# Patient Record
Sex: FEMALE | Race: WHITE | NOT HISPANIC OR LATINO | Employment: FULL TIME | URBAN - METROPOLITAN AREA
[De-identification: names, ages, dates, MRNs, and addresses within clinical notes are randomized per-mention and may not be internally consistent; named-entity substitution may affect disease eponyms.]

---

## 2021-04-15 ENCOUNTER — HOSPITAL ENCOUNTER (OUTPATIENT)
Dept: VACCINE CLINIC | Facility: HOSPITAL | Age: 56
Discharge: HOME | End: 2021-04-15
Attending: INTERNAL MEDICINE

## 2021-05-06 ENCOUNTER — HOSPITAL ENCOUNTER (OUTPATIENT)
Dept: VACCINE CLINIC | Facility: HOSPITAL | Age: 56
Discharge: HOME | End: 2021-05-06
Attending: INTERNAL MEDICINE

## 2021-05-07 ENCOUNTER — OFFICE VISIT CONVERTED (OUTPATIENT)
Dept: FAMILY MEDICINE CLINIC | Facility: CLINIC | Age: 56
End: 2021-05-07
Attending: FAMILY MEDICINE

## 2021-05-11 ENCOUNTER — HOSPITAL ENCOUNTER (OUTPATIENT)
Dept: FAMILY MEDICINE CLINIC | Facility: CLINIC | Age: 56
Discharge: HOME | End: 2021-05-11
Attending: FAMILY MEDICINE

## 2021-05-11 LAB
25(OH)D3 SERPL-MCNC: 18.3 NG/ML (ref 30–100)
ALBUMIN SERPL-MCNC: 4.4 G/DL (ref 3.5–5)
ALBUMIN/GLOB SERPL: 1.4 {RATIO} (ref 1.4–2.6)
ALP SERPL-CCNC: 81 U/L (ref 53–141)
ALT SERPL-CCNC: 16 U/L (ref 10–40)
ANION GAP SERPL CALC-SCNC: 14 MMOL/L (ref 8–19)
AST SERPL-CCNC: 16 U/L (ref 15–50)
BASOPHILS # BLD AUTO: 0.09 10*3/UL (ref 0–0.2)
BASOPHILS NFR BLD AUTO: 1.1 % (ref 0–3)
BILIRUB SERPL-MCNC: 0.38 MG/DL (ref 0.2–1.3)
BUN SERPL-MCNC: 11 MG/DL (ref 5–25)
BUN/CREAT SERPL: 16 {RATIO} (ref 6–20)
CALCIUM SERPL-MCNC: 9.1 MG/DL (ref 8.7–10.4)
CHLORIDE SERPL-SCNC: 101 MMOL/L (ref 99–111)
CHOLEST SERPL-MCNC: 187 MG/DL (ref 107–200)
CHOLEST/HDLC SERPL: 3.5 {RATIO} (ref 3–6)
CONV ABS IMM GRAN: 0.02 10*3/UL (ref 0–0.2)
CONV CO2: 28 MMOL/L (ref 22–32)
CONV IMMATURE GRAN: 0.2 % (ref 0–1.8)
CONV TOTAL PROTEIN: 7.6 G/DL (ref 6.3–8.2)
CREAT UR-MCNC: 0.67 MG/DL (ref 0.5–0.9)
DEPRECATED RDW RBC AUTO: 40.2 FL (ref 36.4–46.3)
EOSINOPHIL # BLD AUTO: 0.25 10*3/UL (ref 0–0.7)
EOSINOPHIL # BLD AUTO: 3 % (ref 0–7)
ERYTHROCYTE [DISTWIDTH] IN BLOOD BY AUTOMATED COUNT: 11.9 % (ref 11.7–14.4)
GFR SERPLBLD BASED ON 1.73 SQ M-ARVRAT: >60 ML/MIN/{1.73_M2}
GLOBULIN UR ELPH-MCNC: 3.2 G/DL (ref 2–3.5)
GLUCOSE SERPL-MCNC: 103 MG/DL (ref 65–99)
HCT VFR BLD AUTO: 38 % (ref 37–47)
HDLC SERPL-MCNC: 53 MG/DL (ref 40–60)
HGB BLD-MCNC: 12.7 G/DL (ref 12–16)
LDLC SERPL CALC-MCNC: 118 MG/DL (ref 70–100)
LYMPHOCYTES # BLD AUTO: 2.42 10*3/UL (ref 1–5)
LYMPHOCYTES NFR BLD AUTO: 28.8 % (ref 20–45)
MCH RBC QN AUTO: 30.7 PG (ref 27–31)
MCHC RBC AUTO-ENTMCNC: 33.4 G/DL (ref 33–37)
MCV RBC AUTO: 91.8 FL (ref 81–99)
MONOCYTES # BLD AUTO: 0.43 10*3/UL (ref 0.2–1.2)
MONOCYTES NFR BLD AUTO: 5.1 % (ref 3–10)
NEUTROPHILS # BLD AUTO: 5.18 10*3/UL (ref 2–8)
NEUTROPHILS NFR BLD AUTO: 61.8 % (ref 30–85)
NRBC CBCN: 0 % (ref 0–0.7)
OSMOLALITY SERPL CALC.SUM OF ELEC: 288 MOSM/KG (ref 273–304)
PLATELET # BLD AUTO: 289 10*3/UL (ref 130–400)
PMV BLD AUTO: 9.6 FL (ref 9.4–12.3)
POTASSIUM SERPL-SCNC: 4.1 MMOL/L (ref 3.5–5.3)
RBC # BLD AUTO: 4.14 10*6/UL (ref 4.2–5.4)
SODIUM SERPL-SCNC: 139 MMOL/L (ref 135–147)
TRIGL SERPL-MCNC: 79 MG/DL (ref 40–150)
TSH SERPL-ACNC: 0.67 M[IU]/L (ref 0.27–4.2)
VLDLC SERPL-MCNC: 16 MG/DL (ref 5–37)
WBC # BLD AUTO: 8.39 10*3/UL (ref 4.8–10.8)

## 2021-05-22 ENCOUNTER — TRANSCRIBE ORDERS (OUTPATIENT)
Dept: ADMINISTRATIVE | Facility: HOSPITAL | Age: 56
End: 2021-05-22

## 2021-05-22 DIAGNOSIS — Z12.31 SCREENING MAMMOGRAM, ENCOUNTER FOR: Primary | ICD-10-CM

## 2021-06-04 ENCOUNTER — TRANSCRIBE ORDERS (OUTPATIENT)
Dept: ADMINISTRATIVE | Facility: HOSPITAL | Age: 56
End: 2021-06-04

## 2021-06-04 ENCOUNTER — CONVERSION ENCOUNTER (OUTPATIENT)
Dept: FAMILY MEDICINE CLINIC | Facility: CLINIC | Age: 56
End: 2021-06-04

## 2021-06-04 ENCOUNTER — OFFICE VISIT CONVERTED (OUTPATIENT)
Dept: FAMILY MEDICINE CLINIC | Facility: CLINIC | Age: 56
End: 2021-06-04
Attending: FAMILY MEDICINE

## 2021-06-04 DIAGNOSIS — R07.89 OTHER CHEST PAIN: Primary | ICD-10-CM

## 2021-06-05 NOTE — H&P
"   History and Physical      Patient Name: Basia Martin   Patient ID: [id number]   Sex: Female   YOB: 1965    Primary Care Provider: Raquel Machado MD   Referring Provider: Raquel Machado MD    Visit Date: May 7, 2021    Provider: Raquel Machado MD   Location: [facility]   Location Address: [address] [place] [address], [address] [place] [address]  [id number]   Location Phone: [phone]          Chief Complaint  · New Patient Visit      History Of Present Illness  Basia Martin is a 55 year old /White female who presents for evaluation and treatment of:      Pt is originally from [place].  Pt moved here with her mother.     Urinary Incontinence- pt is not taking anything currently and reports it started to get better after she quit smoking.  Pt was given Myrbetriq samples from the office today.    Pt started smoking at age 10 and has smoked at least a pack a day. She no longer smokes but now vapes.    Pt reports he has been getting weird feeling in her legs and felt like a tingle and feels like \"vicks under the skin\".    Depression- Pt reports she feels she has been getting more and more depressed with life changing events occurring back to back.      Social Anxiety- pt reports that she has found that she is more anxious being out around a lot of people and feels that the loss of her brother triggered PTSD in that she is very jumpy.       Past Medical History  Allergies; Anxiety; Depression; Hemorrhoids; Night sweats; Psoriasis; Psychiatric problem; Sinus problem         Past Surgical History  Colonoscopy; Dilation and Curettage; Lymph node biopsy; Pilonidal cystectomy         Medication List  Advil 200 mg oral tablet         Allergy List  Pamprin         Family Medical History  Heart Disease; - No Family History of Colorectal Cancer; Diabetes         Reproductive History   0 Para 0 0 0 0       Social History  Alcohol (Current some day); Tobacco (Current every " "day)         Review of Systems  · Constitutional  o Denies  o : fatigue, fever, weight loss, weight gain  · Eyes  o Denies  o : eye discomfort, eye pain  · HENT  o Denies  o : vertigo, nasal congestion  · Cardiovascular  o Denies  o : chest pain, irregular heart beats  · Respiratory  o Denies  o : shortness of breath, wheezing  · Gastrointestinal  o Denies  o : nausea, vomiting  · Genitourinary  o Denies  o : frequency, dysuria  · Integument  o Denies  o : rash, itching  · Neurologic  o Denies  o : muscular weakness, memory difficulties  · Musculoskeletal  o Denies  o : joint swelling, muscle pain  · Psychiatric  o Admits  o : anxiety, depression  · Heme-Lymph  o Denies  o : lightheadedness, easy bleeding  · Allergic-Immunologic  o Denies  o : sinus allergy symptoms, allergic dermatitis      Vitals  Date Time BP Position Site L\R Cuff Size HR RR TEMP (F) WT  HT  BMI kg/m2 BSA m2 O2 Sat FR L/min FiO2        05/07/2021 02:54 /76 Sitting    76 - R 16 98 168lbs 4oz 5'  5\" 28 1.87 100 %            Physical Examination  · Constitutional  o Appearance  o : well-nourished, in no acute distress  · Eyes  o Conjunctivae  o : conjunctivae normal  o Sclerae  o : sclerae white  o Pupils and Irises  o : pupils equal, round, and reactive to light and accommodation bilaterally  o Eyelids/Ocular Adnexae  o : eyelid appearance normal, no exudates present  · Ears, Nose, Mouth and Throat  o Ears  o :   § External Ears  § : external auditory canal appearance within normal limits, no discharge present  § Otoscopic Examination  § : tympanic membrane appearance within normal limits bilaterally  o Nose  o :   § External Nose  § : appearance normal  § Nasopharynx  § : no discharge present  o Oral Cavity  o :   § Oral Mucosa  § : oral mucosa normal  § Lips  § : lip appearance normal  o Throat  o :   § Oropharynx  § : no inflammation or lesions present, tonsils within normal limits  · Neck  o Inspection/Palpation  o : normal appearance, " no masses or tenderness, trachea midline  o Range of Motion  o : cervical range of motion within normal limits  o Thyroid  o : gland size normal, nontender, no nodules or masses present on palpation  o Jugular Veins  o : JVP normal  · Respiratory  o Respiratory Effort  o : breathing unlabored  o Inspection of Chest  o : normal appearance  o Auscultation of Lungs  o : normal breath sounds throughout  · Cardiovascular  o Heart  o :   § Auscultation of Heart  § : regular rate and rhythm, no murmurs, gallops or rubs  o Peripheral Vascular System  o :   § Extremities  § : no edema  · Gastrointestinal  o Abdominal Examination  o : abdomen nontender to palpation, tone normal without rigidity or guarding, no masses present, bowel sounds present in all four quadrants  o Liver and spleen  o : no hepatomegaly present, liver nontender to palpation, spleen not palpable  o Hernias  o : no hernias present  · Lymphatic  o Neck  o : no lymphadenopathy present  · Musculoskeletal  o Spine  o :   § Inspection/Palpation  § : no spinal tenderness, scoliosis or kyphosis present  § Stability  § : no subluxations present  § Range of Motion  § : spine range of motion normal  o Right Upper Extremity  o :   § Inspection/Palpation  § : no tenderness to palpation, ROM normal  o Left Upper Extremity  o :   § Inspection/Palpation  § : no tenderness to palpation, ROM normal  o Right Lower Extremity  o :   § Inspection/Palpation  § : no joint or limb tenderness to palpation, ROM normal  o Left Lower Extremity  o :   § Inspection/Palpation  § : no joint or limb tenderness to palpation, ROM normal  · Skin and Subcutaneous Tissue  o General Inspection  o : no rashes or lesions present, no areas of discoloration  o Body Hair  o : scalp palpation normal, hair normal for age, general body hair distribution normal for age  o Digits and Nails  o : no clubbing, cyanosis, deformities or edema present, normal appearing nails  · Neurologic  o Mental Status  Examination  o :   § Orientation  § : grossly oriented to person, place and time  o Gait and Station  o : normal gait, able to stand without difficulty  · Psychiatric  o Judgement and Insight  o : judgment and insight intact  o Mood and Affect  o : mood normal, affect appropriate              Assessment  · Depression     311/F32.9  · Hyperlipidemia     272.4/E78.5  · Nicotine dependence     305.1/F17.200  · Vitamin D deficiency     268.9/E55.9  · Screening for depression     V79.0/Z13.89  · Screening for colon cancer     V76.51/Z12.11  · Visit for screening mammogram     V76.12/Z12.31  · Social anxiety disorder     300.23/F40.10  · Urinary incontinence     788.30/R32      Plan  · Orders  o ACO-17: Screened for tobacco use AND received tobacco cessation intervention (4004F) - 305.1/F17.200 - 05/07/2021  o Low dose CT scan (LDCT) for lung cancer screening [facility] () - 305.1/F17.200 - 05/07/2021   please try to schedule around 6/4/2021  o Vitamin D Level (88906) - 268.9/E55.9 - 05/07/2021  o Cologuard (27756) - V76.51/Z12.11 - 05/07/2021  o Screening Mammography; Bilateral 3D (74326, , 51817) - V76.12/Z12.31 - 05/07/2021   please try to schedule around 6/4/2021  o Physical, Primary Care Panel (CBC, CMP, Lipid, TSH) [facility] (08796, 57214, 52934, 15476) - 311/F32.9, 272.4/E78.5 - 05/07/2021  o ACO-17: Screened for tobacco use AND received tobacco cessation intervention (4004F) - - 05/07/2021  o ACO-18: Positive screen for clinical depression using a standardized tool and a follow-up plan documented () - - 05/07/2021  o ACO-39: Current medications updated and reviewed (, 5049F) - - 05/07/2021  · Medications  o fluoxetine 10 mg oral capsule   SIG: take 1 capsule (10 mg) by oral route once daily for 30 days   DISP: (30) Capsule with 2 refills  Prescribed on 05/07/2021     o Medications have been Reconciled  o Transition of Care or Provider Policy  · Instructions  o Advised that cheeses and other sources of dairy  fats, animal fats, fast food, and the extras (candy, pastries, pies, doughnuts and cookies) all contain LDL raising nutrients. Advised to increase fruits, vegetables, whole grains, and to monitor portion sizes.   o *Form of nicotine being used:   o Patient was strongly encouraged to discontinue use of any nicotine containing product or minimize the use of the product.  o Depression Screen completed and scanned into the EMR under the designated folder within the patient's documents.  o Today's PHQ-9 result is __7_  o Patient was educated/instructed on their diagnosis, treatment and medications prior to discharge from the clinic today.  o Minutes spent with patient including greater than 50% in Education/Counseling/Care Coordination.  o Time spent with the patient was minutes, more than 50% face to face. 25 minutes  · Disposition  o f/u 2 months            Electronically Signed by: Raquel Machado MD -Author on May 7, 2021 06:34:31 PM

## 2021-06-05 NOTE — PROGRESS NOTES
Progress Note      Patient Name: Basia Martin   Patient ID: [id number]   Sex: Female   YOB: 1965    Primary Care Provider: Raquel Machado MD   Referring Provider: Raquel Machado MD    Visit Date: 2021    Provider: Raquel Machado MD   Location: [facility]   Location Address: [address] [place] [address], [address] [place] [address]  [id number]   Location Phone: [phone]          Chief Complaint  · Follow up on labs      History Of Present Illness  Basia Martin is a 55 year old /White female who presents for evaluation and treatment of:      Pt quit smoking in 2021 but now she vapes. Pt was 10 when she started smoking and was smoking a pack a day.  Pt reports she has been having sharp left sided chest pains that have been radiating through her axilla. I will be ordering a Chest CT since she is having chest pain with a history of smoking 30 plus years.    Pt just had labs done and all were normal.       Past Medical History  Allergies; Anxiety; Chemical dependency; Depression; Hemorrhoids; Night sweats; Psoriasis; Psychiatric problem; Sinus problem         Past Surgical History  Colonoscopy; Dilation and Curettage; Lymph node biopsy; Pilonidal cystectomy         Medication List  Advil 200 mg oral tablet; fluoxetine 10 mg oral capsule; Vitamin D2 1,250 mcg (50,000 unit) oral capsule         Allergy List  Pamprin       Allergies Reconciled  Family Medical History  Heart Disease; - No Family History of Colorectal Cancer; Diabetes         Reproductive History   0 Para 0 0 0 0       Social History  Alcohol (Current some day); Tobacco (Current every day)         Review of Systems  · Constitutional  o Denies  o : fatigue, fever, weight loss, weight gain  · Eyes  o Denies  o : eye discomfort, eye pain  · HENT  o Denies  o : vertigo, nasal congestion  · Cardiovascular  o Admits  o : chest pain  o Denies  o : irregular heart beats  · Respiratory  o Denies  o :  "shortness of breath, wheezing  · Gastrointestinal  o Denies  o : nausea, vomiting  · Genitourinary  o Denies  o : frequency, dysuria  · Integument  o Denies  o : rash, itching  · Neurologic  o Denies  o : muscular weakness, memory difficulties  · Musculoskeletal  o Denies  o : joint swelling, muscle pain  · Psychiatric  o Denies  o : anxiety, depression  · Heme-Lymph  o Denies  o : lightheadedness, easy bleeding  · Allergic-Immunologic  o Denies  o : sinus allergy symptoms, allergic dermatitis      Vitals  Date Time BP Position Site L\R Cuff Size HR RR TEMP (F) WT  HT  BMI kg/m2 BSA m2 O2 Sat FR L/min FiO2 HC       06/04/2021 09:42 /74 Sitting    54 - R 14 97.9 166lbs 16oz 5'  5\" 27.79 1.86 94 %            Physical Examination  · Constitutional  o Appearance  o : well-nourished, in no acute distress  · Eyes  o Conjunctivae  o : conjunctivae normal  o Sclerae  o : sclerae white  o Pupils and Irises  o : pupils equal, round, and reactive to light and accommodation bilaterally  o Eyelids/Ocular Adnexae  o : eyelid appearance normal, no exudates present  · Ears, Nose, Mouth and Throat  o Ears  o :   § External Ears  § : external auditory canal appearance within normal limits, no discharge present  § Otoscopic Examination  § : tympanic membrane appearance within normal limits bilaterally  o Nose  o :   § External Nose  § : appearance normal  § Nasopharynx  § : no discharge present  o Oral Cavity  o :   § Oral Mucosa  § : oral mucosa normal  § Lips  § : lip appearance normal  o Throat  o :   § Oropharynx  § : no inflammation or lesions present, tonsils within normal limits  · Neck  o Inspection/Palpation  o : normal appearance, no masses or tenderness, trachea midline  o Range of Motion  o : cervical range of motion within normal limits  o Thyroid  o : gland size normal, nontender, no nodules or masses present on palpation  o Jugular Veins  o : JVP normal  · Respiratory  o Respiratory Effort  o : breathing " unlabored  o Inspection of Chest  o : normal appearance  o Auscultation of Lungs  o : normal breath sounds throughout  · Cardiovascular  o Heart  o :   § Auscultation of Heart  § : regular rate and rhythm, no murmurs, gallops or rubs  o Peripheral Vascular System  o :   § Extremities  § : no edema  · Gastrointestinal  o Abdominal Examination  o : abdomen nontender to palpation, tone normal without rigidity or guarding, no masses present, bowel sounds present in all four quadrants  o Liver and spleen  o : no hepatomegaly present, liver nontender to palpation, spleen not palpable  o Hernias  o : no hernias present  · Lymphatic  o Neck  o : no lymphadenopathy present  · Musculoskeletal  o Spine  o :   § Inspection/Palpation  § : no spinal tenderness, scoliosis or kyphosis present  § Stability  § : no subluxations present  § Range of Motion  § : spine range of motion normal  o Right Upper Extremity  o :   § Inspection/Palpation  § : no tenderness to palpation, ROM normal  o Left Upper Extremity  o :   § Inspection/Palpation  § : no tenderness to palpation, ROM normal  o Right Lower Extremity  o :   § Inspection/Palpation  § : no joint or limb tenderness to palpation, ROM normal  o Left Lower Extremity  o :   § Inspection/Palpation  § : no joint or limb tenderness to palpation, ROM normal  · Skin and Subcutaneous Tissue  o General Inspection  o : no rashes or lesions present, no areas of discoloration  o Body Hair  o : scalp palpation normal, hair normal for age, general body hair distribution normal for age  o Digits and Nails  o : no clubbing, cyanosis, deformities or edema present, normal appearing nails  · Neurologic  o Mental Status Examination  o :   § Orientation  § : grossly oriented to person, place and time  o Gait and Station  o : normal gait, able to stand without difficulty  · Psychiatric  o Judgement and Insight  o : judgment and insight intact  o Mood and Affect  o : mood normal, affect  appropriate              Assessment  · Chest pain     786.50/R07.9  · Smoking greater than 40 pack years     305.1/F17.210  · Vapes nicotine containing substance     305.1/Z72.0      Plan  · Orders  o ACO-17: Screened for tobacco use AND received tobacco cessation intervention (4004F) - - 06/04/2021  o ACO-39: Current medications updated and reviewed (, 1159F) - - 06/04/2021  o CT Chest without Contrast [facility] (73193) - 786.50/R07.9 - 06/04/2021  · Medications  o Medications have been Reconciled  o Transition of Care or Provider Policy  · Instructions  o Patient was educated/instructed on their diagnosis, treatment and medications prior to discharge from the clinic today.  o Minutes spent with patient including greater than 50% in Education/Counseling/Care Coordination.  o Time spent with the patient was minutes, more than 50% face to face. 25 minutes  · Disposition  o f/u 1 month            Electronically Signed by: Raquel Machado MD -Author on June 4, 2021 10:58:10 AM

## 2021-06-09 ENCOUNTER — HOSPITAL ENCOUNTER (OUTPATIENT)
Dept: CT IMAGING | Facility: HOSPITAL | Age: 56
Discharge: HOME | End: 2021-06-09
Admitting: FAMILY MEDICINE

## 2021-06-09 DIAGNOSIS — R07.89 OTHER CHEST PAIN: ICD-10-CM

## 2021-06-09 PROCEDURE — 71271 CT THORAX LUNG CANCER SCR C-: CPT

## 2021-07-02 PROBLEM — R61 NIGHT SWEATS: Status: ACTIVE | Noted: 2021-07-02

## 2021-07-02 PROBLEM — K64.9 HEMORRHOIDS: Status: ACTIVE | Noted: 2021-07-02

## 2021-07-02 PROBLEM — L40.9 PSORIASIS: Status: ACTIVE | Noted: 2021-07-02

## 2021-07-02 PROBLEM — F41.9 ANXIETY: Status: ACTIVE | Noted: 2021-07-02

## 2021-07-02 PROBLEM — F99 PSYCHIATRIC PROBLEM: Status: ACTIVE | Noted: 2021-07-02

## 2021-07-02 PROBLEM — F19.20 CHEMICAL DEPENDENCY: Status: ACTIVE | Noted: 2021-07-02

## 2021-07-02 PROBLEM — F32.A DEPRESSION: Status: ACTIVE | Noted: 2021-07-02

## 2021-07-02 PROBLEM — J01.80 OTHER ACUTE SINUSITIS: Status: ACTIVE | Noted: 2021-07-02

## 2021-07-05 ENCOUNTER — HOSPITAL ENCOUNTER (EMERGENCY)
Facility: HOSPITAL | Age: 56
Discharge: LEFT BEFORE DISCHARGE | End: 2021-07-05

## 2021-07-05 VITALS
HEIGHT: 65 IN | OXYGEN SATURATION: 100 % | DIASTOLIC BLOOD PRESSURE: 65 MMHG | RESPIRATION RATE: 16 BRPM | BODY MASS INDEX: 28.28 KG/M2 | WEIGHT: 169.75 LBS | TEMPERATURE: 98.1 F | SYSTOLIC BLOOD PRESSURE: 136 MMHG | HEART RATE: 57 BPM

## 2021-07-05 LAB
ALBUMIN SERPL-MCNC: 4.5 G/DL (ref 3.5–5.2)
ALBUMIN/GLOB SERPL: 1.6 G/DL
ALP SERPL-CCNC: 80 U/L (ref 39–117)
ALT SERPL W P-5'-P-CCNC: 13 U/L (ref 1–33)
ANION GAP SERPL CALCULATED.3IONS-SCNC: 6.5 MMOL/L (ref 5–15)
AST SERPL-CCNC: 11 U/L (ref 1–32)
BASOPHILS # BLD AUTO: 0.07 10*3/MM3 (ref 0–0.2)
BASOPHILS NFR BLD AUTO: 0.9 % (ref 0–1.5)
BILIRUB SERPL-MCNC: <0.2 MG/DL (ref 0–1.2)
BUN SERPL-MCNC: 13 MG/DL (ref 6–20)
BUN/CREAT SERPL: 17.6 (ref 7–25)
CALCIUM SPEC-SCNC: 9.5 MG/DL (ref 8.6–10.5)
CHLORIDE SERPL-SCNC: 105 MMOL/L (ref 98–107)
CO2 SERPL-SCNC: 29.5 MMOL/L (ref 22–29)
CREAT SERPL-MCNC: 0.74 MG/DL (ref 0.57–1)
DEPRECATED RDW RBC AUTO: 39.9 FL (ref 37–54)
EOSINOPHIL # BLD AUTO: 0.23 10*3/MM3 (ref 0–0.4)
EOSINOPHIL NFR BLD AUTO: 2.8 % (ref 0.3–6.2)
ERYTHROCYTE [DISTWIDTH] IN BLOOD BY AUTOMATED COUNT: 12 % (ref 12.3–15.4)
GFR SERPL CREATININE-BSD FRML MDRD: 81 ML/MIN/1.73
GLOBULIN UR ELPH-MCNC: 2.8 GM/DL
GLUCOSE SERPL-MCNC: 106 MG/DL (ref 65–99)
HCT VFR BLD AUTO: 39.9 % (ref 34–46.6)
HGB BLD-MCNC: 13.5 G/DL (ref 12–15.9)
HOLD SPECIMEN: NORMAL
HOLD SPECIMEN: NORMAL
IMM GRANULOCYTES # BLD AUTO: 0.03 10*3/MM3 (ref 0–0.05)
IMM GRANULOCYTES NFR BLD AUTO: 0.4 % (ref 0–0.5)
LYMPHOCYTES # BLD AUTO: 2.52 10*3/MM3 (ref 0.7–3.1)
LYMPHOCYTES NFR BLD AUTO: 31.2 % (ref 19.6–45.3)
MCH RBC QN AUTO: 30.9 PG (ref 26.6–33)
MCHC RBC AUTO-ENTMCNC: 33.8 G/DL (ref 31.5–35.7)
MCV RBC AUTO: 91.3 FL (ref 79–97)
MONOCYTES # BLD AUTO: 0.38 10*3/MM3 (ref 0.1–0.9)
MONOCYTES NFR BLD AUTO: 4.7 % (ref 5–12)
NEUTROPHILS NFR BLD AUTO: 4.85 10*3/MM3 (ref 1.7–7)
NEUTROPHILS NFR BLD AUTO: 60 % (ref 42.7–76)
NRBC BLD AUTO-RTO: 0 /100 WBC (ref 0–0.2)
NT-PROBNP SERPL-MCNC: 248.9 PG/ML (ref 0–900)
PLATELET # BLD AUTO: 267 10*3/MM3 (ref 140–450)
PMV BLD AUTO: 9.2 FL (ref 6–12)
POTASSIUM SERPL-SCNC: 5.4 MMOL/L (ref 3.5–5.2)
PROT SERPL-MCNC: 7.3 G/DL (ref 6–8.5)
RBC # BLD AUTO: 4.37 10*6/MM3 (ref 3.77–5.28)
SODIUM SERPL-SCNC: 141 MMOL/L (ref 136–145)
TROPONIN T SERPL-MCNC: <0.01 NG/ML (ref 0–0.03)
WBC # BLD AUTO: 8.08 10*3/MM3 (ref 3.4–10.8)
WHOLE BLOOD HOLD SPECIMEN: NORMAL

## 2021-07-05 PROCEDURE — 99211 OFF/OP EST MAY X REQ PHY/QHP: CPT

## 2021-07-05 PROCEDURE — 85025 COMPLETE CBC W/AUTO DIFF WBC: CPT

## 2021-07-05 PROCEDURE — 84484 ASSAY OF TROPONIN QUANT: CPT

## 2021-07-05 PROCEDURE — 93010 ELECTROCARDIOGRAM REPORT: CPT | Performed by: SPECIALIST

## 2021-07-05 PROCEDURE — 93005 ELECTROCARDIOGRAM TRACING: CPT

## 2021-07-05 PROCEDURE — 83880 ASSAY OF NATRIURETIC PEPTIDE: CPT

## 2021-07-05 PROCEDURE — 80053 COMPREHEN METABOLIC PANEL: CPT

## 2021-07-05 RX ORDER — SODIUM CHLORIDE 0.9 % (FLUSH) 0.9 %
10 SYRINGE (ML) INJECTION AS NEEDED
Status: DISCONTINUED | OUTPATIENT
Start: 2021-07-05 | End: 2021-07-05 | Stop reason: HOSPADM

## 2021-07-07 LAB — QT INTERVAL: 451 MS

## 2021-07-15 VITALS
OXYGEN SATURATION: 94 % | TEMPERATURE: 97.9 F | BODY MASS INDEX: 27.82 KG/M2 | SYSTOLIC BLOOD PRESSURE: 136 MMHG | RESPIRATION RATE: 14 BRPM | HEIGHT: 65 IN | WEIGHT: 167 LBS | DIASTOLIC BLOOD PRESSURE: 74 MMHG | HEART RATE: 56 BPM

## 2021-07-15 VITALS
RESPIRATION RATE: 16 BRPM | HEART RATE: 76 BPM | BODY MASS INDEX: 28.03 KG/M2 | TEMPERATURE: 98 F | DIASTOLIC BLOOD PRESSURE: 76 MMHG | OXYGEN SATURATION: 100 % | HEIGHT: 65 IN | SYSTOLIC BLOOD PRESSURE: 138 MMHG | WEIGHT: 168.25 LBS

## 2021-08-30 ENCOUNTER — HOSPITAL ENCOUNTER (OUTPATIENT)
Dept: MAMMOGRAPHY | Facility: HOSPITAL | Age: 56
Discharge: HOME | End: 2021-08-30
Admitting: FAMILY MEDICINE

## 2021-08-30 DIAGNOSIS — Z12.31 SCREENING MAMMOGRAM, ENCOUNTER FOR: ICD-10-CM

## 2021-08-30 PROCEDURE — 77067 SCR MAMMO BI INCL CAD: CPT

## 2021-08-30 PROCEDURE — 77063 BREAST TOMOSYNTHESIS BI: CPT

## 2021-08-31 ENCOUNTER — APPOINTMENT (OUTPATIENT)
Dept: MAMMOGRAPHY | Facility: HOSPITAL | Age: 56
End: 2021-08-31

## 2021-10-01 RX ORDER — DOXYCYCLINE HYCLATE 50 MG/1
CAPSULE ORAL
Status: CANCELLED | OUTPATIENT
Start: 2021-10-01

## 2021-10-01 NOTE — TELEPHONE ENCOUNTER
Caller: Basia Martin    Relationship: Self      Medication requested (name and dosage): doxycycline (VIBRAMYCIN) 50 MG capsule    Pharmacy where request should be sent: [address] - [phone] PH - [phone] FX  [phone]    Additional details provided by patient: THE PATIENT STATED SHE WOULD LIKE A REFILL OF THIS MEDICATION ASAP    Best call back number: [phone]    Does the patient have less than a 3 day supply:  [x] Yes  [] No    Félix Olea Rep   10/01/21 15:52 EDT

## 2021-10-02 RX ORDER — DOXYCYCLINE HYCLATE 50 MG/1
50 CAPSULE ORAL 2 TIMES DAILY
Qty: 90 CAPSULE | Refills: 0 | Status: SHIPPED | OUTPATIENT
Start: 2021-10-02 | End: 2022-03-25

## 2022-02-15 RX ORDER — ERGOCALCIFEROL 1.25 MG/1
50000 CAPSULE ORAL WEEKLY
Qty: 5 CAPSULE | OUTPATIENT
Start: 2022-02-15

## 2022-02-15 RX ORDER — DOXYCYCLINE HYCLATE 50 MG/1
50 CAPSULE ORAL 2 TIMES DAILY
Qty: 90 CAPSULE | Refills: 0 | OUTPATIENT
Start: 2022-02-15

## 2022-02-15 NOTE — TELEPHONE ENCOUNTER
Caller: Basia Martin    Relationship: Self    Best call back number: [phone]    Requested Prescriptions:   Requested Prescriptions     Pending Prescriptions Disp Refills   • vitamin D (ERGOCALCIFEROL) 1.25 MG (57634 UT) capsule capsule 5 capsule      Sig: Take 1 capsule by mouth 1 (One) Time Per Week.   • doxycycline (VIBRAMYCIN) 50 MG capsule 90 capsule 0     Sig: Take 1 capsule by mouth 2 (Two) Times a Day.        Pharmacy where request should be sent: [address] - [phone]  - [phone] FX     Additional details provided by patient: NONE ON HAND     Does the patient have less than a 3 day supply:  [x] Yes  [] No    Félix Torres Rep   02/15/22 15:29 EST

## 2022-02-17 NOTE — TELEPHONE ENCOUNTER
PATIENT IS ASKING ABOUT HER MEDICATION REFILL ON MYRBETRIQ AND VITAMIN D. WONDERING IF THEY ARE FILLED AND WONDERING IF SHE WILL HAVE TO DO THIS ONCE A MONTH. PLEASE CALL AND ADVISE PATIENT @ [phone]

## 2022-02-18 RX ORDER — ERGOCALCIFEROL 1.25 MG/1
50000 CAPSULE ORAL WEEKLY
Qty: 5 CAPSULE | Refills: 0 | Status: SHIPPED | OUTPATIENT
Start: 2022-02-18 | End: 2022-06-08 | Stop reason: SDUPTHER

## 2022-02-18 NOTE — TELEPHONE ENCOUNTER
PATIENT IS ASKING FOR HER MEDICATIONS TO BE REFILLED. HAS ALREADY ASKED TWICE BEFORE. PLEASE CALL AND ADVISE PATIENT @ [phone]

## 2022-03-25 ENCOUNTER — OFFICE VISIT (OUTPATIENT)
Dept: FAMILY MEDICINE CLINIC | Facility: CLINIC | Age: 57
End: 2022-03-25

## 2022-03-25 VITALS
BODY MASS INDEX: 27.32 KG/M2 | OXYGEN SATURATION: 99 % | SYSTOLIC BLOOD PRESSURE: 110 MMHG | HEART RATE: 52 BPM | RESPIRATION RATE: 16 BRPM | HEIGHT: 65 IN | TEMPERATURE: 97.6 F | WEIGHT: 164 LBS | DIASTOLIC BLOOD PRESSURE: 68 MMHG

## 2022-03-25 DIAGNOSIS — S20.351A: ICD-10-CM

## 2022-03-25 DIAGNOSIS — E55.9 VITAMIN D DEFICIENCY: ICD-10-CM

## 2022-03-25 DIAGNOSIS — R73.9 HYPERGLYCEMIA: ICD-10-CM

## 2022-03-25 DIAGNOSIS — L98.9 SKIN LESION OF LEFT UPPER EXTREMITY: Primary | ICD-10-CM

## 2022-03-25 DIAGNOSIS — R07.89 OTHER CHEST PAIN: ICD-10-CM

## 2022-03-25 DIAGNOSIS — R21 SKIN RASH: ICD-10-CM

## 2022-03-25 DIAGNOSIS — R79.89 ABNORMAL CBC: ICD-10-CM

## 2022-03-25 LAB
25(OH)D3 SERPL-MCNC: 28.6 NG/ML (ref 30–100)
ALBUMIN SERPL-MCNC: 5 G/DL (ref 3.5–5.2)
ALBUMIN/GLOB SERPL: 1.8 G/DL
ALP SERPL-CCNC: 72 U/L (ref 39–117)
ALT SERPL W P-5'-P-CCNC: 18 U/L (ref 1–33)
ANION GAP SERPL CALCULATED.3IONS-SCNC: 10.4 MMOL/L (ref 5–15)
AST SERPL-CCNC: 14 U/L (ref 1–32)
BASOPHILS # BLD AUTO: 0.09 10*3/MM3 (ref 0–0.2)
BASOPHILS NFR BLD AUTO: 1.2 % (ref 0–1.5)
BILIRUB SERPL-MCNC: 0.2 MG/DL (ref 0–1.2)
BUN SERPL-MCNC: 14 MG/DL (ref 6–20)
BUN/CREAT SERPL: 22.2 (ref 7–25)
CALCIUM SPEC-SCNC: 9.8 MG/DL (ref 8.6–10.5)
CHLORIDE SERPL-SCNC: 103 MMOL/L (ref 98–107)
CO2 SERPL-SCNC: 26.6 MMOL/L (ref 22–29)
CREAT SERPL-MCNC: 0.63 MG/DL (ref 0.57–1)
DEPRECATED RDW RBC AUTO: 38.2 FL (ref 37–54)
EGFRCR SERPLBLD CKD-EPI 2021: 103 ML/MIN/1.73
EOSINOPHIL # BLD AUTO: 0.15 10*3/MM3 (ref 0–0.4)
EOSINOPHIL NFR BLD AUTO: 1.9 % (ref 0.3–6.2)
ERYTHROCYTE [DISTWIDTH] IN BLOOD BY AUTOMATED COUNT: 11.8 % (ref 12.3–15.4)
GLOBULIN UR ELPH-MCNC: 2.8 GM/DL
GLUCOSE SERPL-MCNC: 90 MG/DL (ref 65–99)
HBA1C MFR BLD: 5.4 % (ref 4.8–5.6)
HCT VFR BLD AUTO: 39.5 % (ref 34–46.6)
HGB BLD-MCNC: 13.6 G/DL (ref 12–15.9)
IMM GRANULOCYTES # BLD AUTO: 0.02 10*3/MM3 (ref 0–0.05)
IMM GRANULOCYTES NFR BLD AUTO: 0.3 % (ref 0–0.5)
IRON 24H UR-MRATE: 102 MCG/DL (ref 37–145)
LYMPHOCYTES # BLD AUTO: 2.59 10*3/MM3 (ref 0.7–3.1)
LYMPHOCYTES NFR BLD AUTO: 33.6 % (ref 19.6–45.3)
MCH RBC QN AUTO: 30.6 PG (ref 26.6–33)
MCHC RBC AUTO-ENTMCNC: 34.4 G/DL (ref 31.5–35.7)
MCV RBC AUTO: 89 FL (ref 79–97)
MONOCYTES # BLD AUTO: 0.36 10*3/MM3 (ref 0.1–0.9)
MONOCYTES NFR BLD AUTO: 4.7 % (ref 5–12)
NEUTROPHILS NFR BLD AUTO: 4.49 10*3/MM3 (ref 1.7–7)
NEUTROPHILS NFR BLD AUTO: 58.3 % (ref 42.7–76)
NRBC BLD AUTO-RTO: 0 /100 WBC (ref 0–0.2)
PLATELET # BLD AUTO: 315 10*3/MM3 (ref 140–450)
PMV BLD AUTO: 10.1 FL (ref 6–12)
POTASSIUM SERPL-SCNC: 4.2 MMOL/L (ref 3.5–5.2)
PROT SERPL-MCNC: 7.8 G/DL (ref 6–8.5)
RBC # BLD AUTO: 4.44 10*6/MM3 (ref 3.77–5.28)
SODIUM SERPL-SCNC: 140 MMOL/L (ref 136–145)
WBC NRBC COR # BLD: 7.7 10*3/MM3 (ref 3.4–10.8)

## 2022-03-25 PROCEDURE — 83655 ASSAY OF LEAD: CPT | Performed by: FAMILY MEDICINE

## 2022-03-25 PROCEDURE — 82306 VITAMIN D 25 HYDROXY: CPT | Performed by: FAMILY MEDICINE

## 2022-03-25 PROCEDURE — 83036 HEMOGLOBIN GLYCOSYLATED A1C: CPT | Performed by: FAMILY MEDICINE

## 2022-03-25 PROCEDURE — 82300 ASSAY OF CADMIUM: CPT | Performed by: FAMILY MEDICINE

## 2022-03-25 PROCEDURE — 83825 ASSAY OF MERCURY: CPT | Performed by: FAMILY MEDICINE

## 2022-03-25 PROCEDURE — 80053 COMPREHEN METABOLIC PANEL: CPT | Performed by: FAMILY MEDICINE

## 2022-03-25 PROCEDURE — 85025 COMPLETE CBC W/AUTO DIFF WBC: CPT | Performed by: FAMILY MEDICINE

## 2022-03-25 PROCEDURE — 82175 ASSAY OF ARSENIC: CPT | Performed by: FAMILY MEDICINE

## 2022-03-25 PROCEDURE — 83540 ASSAY OF IRON: CPT | Performed by: FAMILY MEDICINE

## 2022-03-25 PROCEDURE — 99214 OFFICE O/P EST MOD 30 MIN: CPT | Performed by: FAMILY MEDICINE

## 2022-03-30 LAB
ARSENIC BLD-MCNC: 1 UG/L (ref 0–9)
CADMIUM BLD-MCNC: 0.7 UG/L (ref 0–1.2)
LEAD BLDV-MCNC: 1 UG/DL (ref 0–4)
MERCURY BLD-MCNC: <1 UG/L (ref 0–14.9)

## 2022-04-18 ENCOUNTER — HOSPITAL ENCOUNTER (OUTPATIENT)
Dept: CT IMAGING | Facility: HOSPITAL | Age: 57
Discharge: HOME | End: 2022-04-18
Admitting: FAMILY MEDICINE

## 2022-04-18 DIAGNOSIS — R07.89 OTHER CHEST PAIN: ICD-10-CM

## 2022-04-18 PROCEDURE — 71250 CT THORAX DX C-: CPT

## 2022-04-19 NOTE — PROGRESS NOTES
Please call and inform patient that she literally has a piece of metal stuck in the right side of her heart.  She does not need to be around any strong magnets or it could move it.  I will discuss with her the options of what to do at our next appointment.

## 2022-04-29 ENCOUNTER — OFFICE VISIT (OUTPATIENT)
Dept: FAMILY MEDICINE CLINIC | Facility: CLINIC | Age: 57
End: 2022-04-29

## 2022-04-29 VITALS
DIASTOLIC BLOOD PRESSURE: 78 MMHG | OXYGEN SATURATION: 100 % | HEART RATE: 56 BPM | BODY MASS INDEX: 27.56 KG/M2 | WEIGHT: 165.4 LBS | HEIGHT: 65 IN | SYSTOLIC BLOOD PRESSURE: 120 MMHG | TEMPERATURE: 97.9 F

## 2022-04-29 DIAGNOSIS — Z23 NEED FOR SHINGLES VACCINE: Primary | ICD-10-CM

## 2022-04-29 DIAGNOSIS — S26.19XA: ICD-10-CM

## 2022-04-29 PROCEDURE — 99214 OFFICE O/P EST MOD 30 MIN: CPT | Performed by: FAMILY MEDICINE

## 2022-05-06 PROBLEM — S26.19XA FOREIGN BODY IN HEART: Status: ACTIVE | Noted: 2022-05-06

## 2022-06-08 NOTE — TELEPHONE ENCOUNTER
Caller: Basia Martin    Relationship: Self    Best call back number: [phone]    Requested Prescriptions:   Requested Prescriptions     Pending Prescriptions Disp Refills   • vitamin D (ERGOCALCIFEROL) 1.25 MG (16460 UT) capsule capsule 5 capsule 0     Sig: Take 1 capsule by mouth 1 (One) Time Per Week.   • Mirabegron ER (MYRBETRIQ) 25 MG tablet sustained-release 24 hour 24 hr tablet 30 tablet 2     Sig: Take 1 tablet by mouth Daily.        Pharmacy where request should be sent: [address] - [phone]  - [phone] FX     Additional details provided by patient:      THE PATIENT IS REQUESTING IF PCP WOULD PUT IN REFILLS SO SHE DO NOT HAVE TO CALL IN EVERY MONTH FOR THEM. THE PATIENT SAID PCP DAMIEN WAS SUPPOSED TO INCREASE HER VITAMIN D BUT SHE HAS NOT HEARD ANYTHING.       Does the patient have less than a 3 day supply:  [x] Yes  [] No    Hope Félix Camacho Rep   06/08/22 15:51 EDT

## 2022-06-09 RX ORDER — ERGOCALCIFEROL 1.25 MG/1
50000 CAPSULE ORAL WEEKLY
Qty: 5 CAPSULE | Refills: 0 | Status: SHIPPED | OUTPATIENT
Start: 2022-06-09 | End: 2022-10-07 | Stop reason: SDUPTHER

## 2022-07-29 ENCOUNTER — TELEPHONE (OUTPATIENT)
Dept: FAMILY MEDICINE CLINIC | Facility: CLINIC | Age: 57
End: 2022-07-29

## 2022-07-29 NOTE — TELEPHONE ENCOUNTER
Caller: Basia Martin    Relationship to patient: Self    Best call back number: [phone]    Date of exposure: 07/22/2022 OR 07/23/2022    Date of positive COVID19 test: 07/25/2022 07/27/2022    Date if possible COVID19 exposure:FRIDAY 07/22/2022 OR 07/23/2022    COVID19 symptoms: SORE THROAT Sunday MORNING 07/24/2022    Date of initial quarantine: N/A    Additional information or concerns: PATIENT TESTED TWICE IN HOME TESTS AND BOTH WERE POSITIVE. SHE WOULD LIKE NOW TO KNOW THE NEXT STEPS TO TAKE. PLEASE CALL PATIENT BACK AND ADVISE. SHE DOES NOT FEEL SICK AT THIS POINT.     What is the patients preferred pharmacy: [address] [place] - [phone]  - [phone]   [phone]

## 2022-08-01 ENCOUNTER — PATIENT MESSAGE (OUTPATIENT)
Dept: FAMILY MEDICINE CLINIC | Facility: CLINIC | Age: 57
End: 2022-08-01

## 2022-08-03 ENCOUNTER — TRANSCRIBE ORDERS (OUTPATIENT)
Dept: ADMINISTRATIVE | Facility: HOSPITAL | Age: 57
End: 2022-08-03

## 2022-08-03 DIAGNOSIS — Z12.31 SCREENING MAMMOGRAM, ENCOUNTER FOR: Primary | ICD-10-CM

## 2022-09-10 ENCOUNTER — HOSPITAL ENCOUNTER (OUTPATIENT)
Dept: MAMMOGRAPHY | Facility: HOSPITAL | Age: 57
Discharge: HOME | End: 2022-09-10
Admitting: FAMILY MEDICINE

## 2022-09-10 DIAGNOSIS — Z12.31 SCREENING MAMMOGRAM, ENCOUNTER FOR: ICD-10-CM

## 2022-09-10 PROCEDURE — 77067 SCR MAMMO BI INCL CAD: CPT

## 2022-09-10 PROCEDURE — 77063 BREAST TOMOSYNTHESIS BI: CPT

## 2022-09-13 ENCOUNTER — TELEPHONE (OUTPATIENT)
Dept: FAMILY MEDICINE CLINIC | Facility: CLINIC | Age: 57
End: 2022-09-13

## 2022-09-27 ENCOUNTER — TELEPHONE (OUTPATIENT)
Dept: FAMILY MEDICINE CLINIC | Facility: CLINIC | Age: 57
End: 2022-09-27

## 2022-10-07 ENCOUNTER — OFFICE VISIT (OUTPATIENT)
Dept: FAMILY MEDICINE CLINIC | Facility: CLINIC | Age: 57
End: 2022-10-07

## 2022-10-07 VITALS
SYSTOLIC BLOOD PRESSURE: 120 MMHG | TEMPERATURE: 98.5 F | DIASTOLIC BLOOD PRESSURE: 86 MMHG | WEIGHT: 162.2 LBS | BODY MASS INDEX: 27.02 KG/M2 | HEIGHT: 65 IN | HEART RATE: 64 BPM | OXYGEN SATURATION: 99 %

## 2022-10-07 DIAGNOSIS — R05.3 CHRONIC COUGH: ICD-10-CM

## 2022-10-07 DIAGNOSIS — Z23 NEED FOR INFLUENZA VACCINATION: Primary | ICD-10-CM

## 2022-10-07 DIAGNOSIS — E55.9 VITAMIN D DEFICIENCY: ICD-10-CM

## 2022-10-07 DIAGNOSIS — M54.9 UPPER BACK PAIN ON LEFT SIDE: ICD-10-CM

## 2022-10-07 DIAGNOSIS — R91.1 LUNG NODULE, SOLITARY: ICD-10-CM

## 2022-10-07 DIAGNOSIS — N39.3 STRESS INCONTINENCE OF URINE: ICD-10-CM

## 2022-10-07 LAB
25(OH)D3 SERPL-MCNC: 23.6 NG/ML (ref 30–100)
ALBUMIN SERPL-MCNC: 5 G/DL (ref 3.5–5.2)
ALBUMIN/GLOB SERPL: 2.1 G/DL
ALP SERPL-CCNC: 70 U/L (ref 39–117)
ALT SERPL W P-5'-P-CCNC: 16 U/L (ref 1–33)
ANION GAP SERPL CALCULATED.3IONS-SCNC: 8.1 MMOL/L (ref 5–15)
AST SERPL-CCNC: 22 U/L (ref 1–32)
BILIRUB SERPL-MCNC: <0.2 MG/DL (ref 0–1.2)
BUN SERPL-MCNC: 12 MG/DL (ref 6–20)
BUN/CREAT SERPL: 17.4 (ref 7–25)
CALCIUM SPEC-SCNC: 9.3 MG/DL (ref 8.6–10.5)
CHLORIDE SERPL-SCNC: 106 MMOL/L (ref 98–107)
CO2 SERPL-SCNC: 26.9 MMOL/L (ref 22–29)
CREAT SERPL-MCNC: 0.69 MG/DL (ref 0.57–1)
EGFRCR SERPLBLD CKD-EPI 2021: 100.1 ML/MIN/1.73
GLOBULIN UR ELPH-MCNC: 2.4 GM/DL
GLUCOSE SERPL-MCNC: 92 MG/DL (ref 65–99)
POTASSIUM SERPL-SCNC: 4.2 MMOL/L (ref 3.5–5.2)
PROT SERPL-MCNC: 7.4 G/DL (ref 6–8.5)
SODIUM SERPL-SCNC: 141 MMOL/L (ref 136–145)

## 2022-10-07 PROCEDURE — 90471 IMMUNIZATION ADMIN: CPT | Performed by: FAMILY MEDICINE

## 2022-10-07 PROCEDURE — 82306 VITAMIN D 25 HYDROXY: CPT | Performed by: FAMILY MEDICINE

## 2022-10-07 PROCEDURE — 90686 IIV4 VACC NO PRSV 0.5 ML IM: CPT | Performed by: FAMILY MEDICINE

## 2022-10-07 PROCEDURE — 80053 COMPREHEN METABOLIC PANEL: CPT | Performed by: FAMILY MEDICINE

## 2022-10-07 PROCEDURE — 99214 OFFICE O/P EST MOD 30 MIN: CPT | Performed by: FAMILY MEDICINE

## 2022-10-07 RX ORDER — ERGOCALCIFEROL 1.25 MG/1
50000 CAPSULE ORAL WEEKLY
Qty: 12 CAPSULE | Refills: 3 | Status: SHIPPED | OUTPATIENT
Start: 2022-10-07 | End: 2023-01-05

## 2022-10-07 NOTE — PROGRESS NOTES
Answers for HPI/ROS submitted by the patient on 9/30/2022  Please describe your symptoms.: Several questions -  , Hot flashes , Leg pain , Female questions , Vitamin D , HPV. - , Prevnar  Have you had these symptoms before?: Yes  How long have you been having these symptoms?: Greater than 2 weeks  Please list any medications you are currently taking for this condition.: Only the one  Please describe any probable cause for these symptoms. : Work  What is the primary reason for your visit?: Other    Chief Complaint  Chief Complaint   Patient presents with   • Leg Pain     Burning feeling    • Cough     Black spots in mucus        HPI:  Basia Martin presents to [facility]    Pt reports she has black spots in her mucus and she has 0.5mm lung nodule on left side and upper back pain on the left side.     Pt reports she still has some urinary incontinence and takes myrbetriq and she was taking it in the morning and she switched to taking it at night.      Metal foreign body in the heart that was evaluated and she was informed that it was not harmful for there to leave it there and it would actually do more damage to try to remove it.   Pt does have a sense of relief that it will not hurt her to leave it there.     Past History:  Medical History: has a past medical history of Allergic (6 years old), Allergies, Anxiety, Chemical dependency (HCC), Depression, Hemorrhoids, Irritable bowel syndrome (Approx.   2004), Night sweats, Psoriasis, Psychiatric problem, and Sinus trouble.   Surgical History: has a past surgical history that includes Colonoscopy (2015); Dilation and curettage of uterus; Lymph node biopsy; and Pilonidal Cystectomy.   Family History: family history includes Diabetes in her mother; Heart disease in her maternal grandfather.   Social History: reports that she quit smoking about 21 months ago. Her smoking use included cigarettes. She has a 45.00 pack-year smoking history.  "She has never used smokeless tobacco. She reports current alcohol use of about 2.0 standard drinks per week. She reports that she does not use drugs.  Immunization History   Administered Date(s) Administered   • COVID-19 (PFIZER) PURPLE CAP 04/15/2021, 05/06/2021   • Covid-19 (Pfizer) Gray Cap 02/18/2022   • FluLaval/Fluzone >6mos 10/07/2022   • Influenza Quad Vaccine (Inpatient) 10/21/2020         Allergies: Pamprin max pain formula [apap-pamabrom-pyrilamine]     Medications:  Current Outpatient Medications on File Prior to Visit   Medication Sig Dispense Refill   • ibuprofen (ADVIL,MOTRIN) 200 MG tablet Advil 200 mg oral tablet take 1 tablet by oral route every 6 hours as needed for 30 days   Active       No current facility-administered medications on file prior to visit.        Health Maintenance Due   Topic Date Due   • ANNUAL PHYSICAL  Never done   • TDAP/TD VACCINES (1 - Tdap) Never done   • HEPATITIS C SCREENING  Never done   • PAP SMEAR  Never done   • COVID-19 Vaccine (5 - Booster for Pfizer series) 09/14/2022       Vital Signs:   Vitals:    10/07/22 1414   BP: 120/86   Pulse: 64   Temp: 98.5 °F (36.9 °C)   SpO2: 99%   Weight: 73.6 kg (162 lb 3.2 oz)   Height: 165.1 cm (65\")      Body mass index is 26.99 kg/m².     ROS:  Review of Systems   Constitutional: Negative for fatigue and fever.   HENT: Negative for congestion, ear pain and sinus pressure.    Respiratory: Positive for cough. Negative for chest tightness and shortness of breath.    Cardiovascular: Negative for chest pain, palpitations and leg swelling.   Gastrointestinal: Negative for abdominal pain and diarrhea.   Genitourinary: Positive for urinary incontinence. Negative for dysuria and frequency.   Neurological: Negative for speech difficulty, headache and confusion.   Psychiatric/Behavioral: Negative for agitation and behavioral problems.          Physical Exam  Vitals reviewed.   Constitutional:       Appearance: Normal appearance.   HENT:     "  Right Ear: Tympanic membrane normal.      Left Ear: Tympanic membrane normal.      Nose: Nose normal.   Eyes:      Extraocular Movements: Extraocular movements intact.      Conjunctiva/sclera: Conjunctivae normal.      Pupils: Pupils are equal, round, and reactive to light.   Cardiovascular:      Rate and Rhythm: Normal rate and regular rhythm.   Pulmonary:      Effort: Pulmonary effort is normal.      Breath sounds: Normal breath sounds.   Abdominal:      General: Bowel sounds are normal.   Musculoskeletal:         General: Normal range of motion.      Cervical back: Normal range of motion.   Skin:     General: Skin is warm and dry.   Neurological:      General: No focal deficit present.      Mental Status: She is alert and oriented to person, place, and time.   Psychiatric:         Mood and Affect: Mood normal.         Behavior: Behavior normal.          Result Review   PROGRESS NOTES - SCAN - CARDIOVASCULAR (06/30/2022)  Mammo Screening Digital Tomosynthesis Bilateral With CAD (09/10/2022 08:34)       Diagnoses and all orders for this visit:    1. Need for influenza vaccination (Primary)  -     FluLaval/Fluzone >6 mos (1532-7276)    2. Chronic cough  -     CT Chest With Contrast; Future  -     Comprehensive Metabolic Panel    3. Lung nodule, solitary  -     CT Chest With Contrast; Future    4. Upper back pain on left side    5. Vitamin D deficiency  -     Vitamin D 25 Hydroxy  -     vitamin D (ERGOCALCIFEROL) 1.25 MG (55654 UT) capsule capsule; Take 1 capsule by mouth 1 (One) Time Per Week for 90 days.  Dispense: 12 capsule; Refill: 3    6. Stress incontinence of urine  -     Mirabegron ER (MYRBETRIQ) 50 MG tablet sustained-release 24 hour 24 hr tablet; Take 50 mg by mouth Daily for 90 days.  Dispense: 90 tablet; Refill: 3          Smoking Cessation:    Basia GUERRERO Martin  reports that she quit smoking about 21 months ago. Her smoking use included cigarettes. She has a 45.00 pack-year smoking history. She has  never used smokeless tobacco.          Follow Up   Return in about 3 months (around 1/7/2023).  Patient was given instructions and counseling regarding her condition or for health maintenance advice. Please see specific information pulled into the AVS if appropriate.       Raquel Machado MD

## 2022-11-02 ENCOUNTER — HOSPITAL ENCOUNTER (OUTPATIENT)
Dept: CT IMAGING | Facility: HOSPITAL | Age: 57
Discharge: HOME | End: 2022-11-02
Admitting: FAMILY MEDICINE

## 2022-11-02 DIAGNOSIS — R05.3 CHRONIC COUGH: ICD-10-CM

## 2022-11-02 DIAGNOSIS — R91.1 LUNG NODULE, SOLITARY: ICD-10-CM

## 2022-11-02 PROCEDURE — 71260 CT THORAX DX C+: CPT

## 2022-11-02 PROCEDURE — 0 IOPAMIDOL PER 1 ML: Performed by: FAMILY MEDICINE

## 2022-11-02 RX ADMIN — IOPAMIDOL 100 ML: 755 INJECTION, SOLUTION INTRAVENOUS at 18:02

## 2022-11-03 NOTE — PROGRESS NOTES
Please call and inform patient her chest  CT showed stable 5mm left lung nodule.  No new or changing nodules.  Repeat scan in 1 year.

## 2023-03-08 ENCOUNTER — OFFICE VISIT (OUTPATIENT)
Dept: FAMILY MEDICINE CLINIC | Facility: CLINIC | Age: 58
End: 2023-03-08
Payer: COMMERCIAL

## 2023-03-08 VITALS
SYSTOLIC BLOOD PRESSURE: 116 MMHG | DIASTOLIC BLOOD PRESSURE: 64 MMHG | WEIGHT: 156.4 LBS | HEIGHT: 65 IN | BODY MASS INDEX: 26.06 KG/M2 | TEMPERATURE: 97.1 F | OXYGEN SATURATION: 98 % | HEART RATE: 63 BPM

## 2023-03-08 DIAGNOSIS — R10.31 RIGHT LOWER QUADRANT ABDOMINAL PAIN: Primary | ICD-10-CM

## 2023-03-08 DIAGNOSIS — N30.00 ACUTE CYSTITIS WITHOUT HEMATURIA: ICD-10-CM

## 2023-03-08 LAB
BILIRUB BLD-MCNC: NEGATIVE MG/DL
CLARITY, POC: CLEAR
COLOR UR: YELLOW
EXPIRATION DATE: ABNORMAL
GLUCOSE UR STRIP-MCNC: NEGATIVE MG/DL
KETONES UR QL: ABNORMAL
LEUKOCYTE EST, POC: ABNORMAL
Lab: ABNORMAL
NITRITE UR-MCNC: NEGATIVE MG/ML
PH UR: 7 [PH] (ref 5–8)
PROT UR STRIP-MCNC: NEGATIVE MG/DL
RBC # UR STRIP: NEGATIVE /UL
SP GR UR: 1.02 (ref 1–1.03)
UROBILINOGEN UR QL: ABNORMAL

## 2023-03-08 PROCEDURE — 87086 URINE CULTURE/COLONY COUNT: CPT | Performed by: NURSE PRACTITIONER

## 2023-03-08 PROCEDURE — 99214 OFFICE O/P EST MOD 30 MIN: CPT | Performed by: NURSE PRACTITIONER

## 2023-03-08 PROCEDURE — 81003 URINALYSIS AUTO W/O SCOPE: CPT | Performed by: NURSE PRACTITIONER

## 2023-03-08 PROCEDURE — 87186 SC STD MICRODIL/AGAR DIL: CPT | Performed by: NURSE PRACTITIONER

## 2023-03-08 RX ORDER — NITROFURANTOIN 25; 75 MG/1; MG/1
100 CAPSULE ORAL 2 TIMES DAILY
Qty: 6 CAPSULE | Refills: 0 | Status: SHIPPED | OUTPATIENT
Start: 2023-03-08

## 2023-03-08 NOTE — PROGRESS NOTES
Chief Complaint  Abdominal Pain    Subjective        Medical History: has a past medical history of Allergic (6 years old), Allergies, Anxiety, Chemical dependency (HCC), Depression, Hemorrhoids, Irritable bowel syndrome (Approx.   2004), Night sweats, Psoriasis, Psychiatric problem, and Sinus trouble.     Surgical History: has a past surgical history that includes Colonoscopy (2015); Dilation and curettage of uterus; Lymph node biopsy; and Pilonidal Cystectomy.     Family History: family history includes Diabetes in her mother; Heart disease in her maternal grandfather.     Social History: reports that she quit smoking about 2 years ago. Her smoking use included cigarettes. She has a 45.00 pack-year smoking history. She has never used smokeless tobacco. She reports current alcohol use of about 2.0 standard drinks per week. She reports that she does not use drugs.    Basia Martin presents to [facility]  Abdominal Pain  This is a new problem. The current episode started in the past 7 days. The onset quality is gradual. The problem occurs rarely. The most recent episode lasted 1 Hours. The problem has been gradually improving. The pain is located in the RLQ. The pain is at a severity of 2/10. The quality of the pain is aching and dull. The abdominal pain radiates to the pelvis. Associated symptoms include arthralgias, dysuria and frequency. Pertinent negatives include no anorexia, belching, constipation, diarrhea, fever, flatus, headaches, hematochezia, hematuria, melena, myalgias, nausea, vomiting or weight loss. The pain is aggravated by certain positions and movement. The pain is relieved by being still. Prior diagnostic workup includes CT scan.   Patient states she was seen at urgent care about 5 days ago she was diagnosed with UTI, she states that she finished her antibiotics yesterday.  She still has some right lower quadrant pain, positional and mainly feels it when she  "bends over.  She denies any bulging at the site, she states it is improving, she does admit that she has a lot of picking up heavy things and moving around heavy things from her job and at home.  She thought she may have pulled a muscle in 1-year-old the dysuria started when she went to urgent care.    We will recheck urine at today's visit to make sure the UTI is resolved.    Objective   Vital Signs:   /64   Pulse 63   Temp 97.1 °F (36.2 °C)   Ht 165.1 cm (65\")   Wt 70.9 kg (156 lb 6.4 oz)   SpO2 98%   BMI 26.03 kg/m²       Wt Readings from Last 3 Encounters:   03/08/23 70.9 kg (156 lb 6.4 oz)   10/07/22 73.6 kg (162 lb 3.2 oz)   04/29/22 75 kg (165 lb 6.4 oz)        BP Readings from Last 3 Encounters:   03/08/23 116/64   10/07/22 120/86   04/29/22 120/78        BMI is >= 25 and <30. (Overweight) The following options were offered after discussion;: weight loss educational material (shared in after visit summary)       Physical Exam  Vitals reviewed.   Constitutional:       Appearance: Normal appearance. She is well-developed.   HENT:      Head: Normocephalic and atraumatic.   Eyes:      Conjunctiva/sclera: Conjunctivae normal.      Pupils: Pupils are equal, round, and reactive to light.   Cardiovascular:      Rate and Rhythm: Normal rate and regular rhythm.      Heart sounds: No murmur heard.  Pulmonary:      Effort: Pulmonary effort is normal.      Breath sounds: Normal breath sounds. No wheezing or rhonchi.   Abdominal:      General: Abdomen is flat.      Palpations: Abdomen is soft.      Tenderness: There is no abdominal tenderness. There is no right CVA tenderness or left CVA tenderness.      Hernia: No hernia is present.   Skin:     General: Skin is warm and dry.   Neurological:      Mental Status: She is alert and oriented to person, place, and time.   Psychiatric:         Mood and Affect: Mood and affect normal.         Behavior: Behavior normal.         Thought Content: Thought content normal.  "        Judgment: Judgment normal.        Result Review :  {The following data was reviewed by HILDA Bravo on 03/08/2023.  Common labs    Common Labs 3/25/22 3/25/22 3/25/22 10/7/22    1427 1427 1428    Glucose  90  92   BUN  14  12   Creatinine  0.63  0.69   Sodium  140  141   Potassium  4.2  4.2   Chloride  103  106   Calcium  9.8  9.3   Albumin  5.00  5.00   Total Bilirubin  0.2  <0.2   Alkaline Phosphatase  72  70   AST (SGOT)  14  22   ALT (SGPT)  18  16   WBC 7.70      Hemoglobin 13.6      Hematocrit 39.5      Platelets 315      Hemoglobin A1C   5.40                   Brief Urine Lab Results  (Last result in the past 365 days)      Color   Clarity   Blood   Leuk Est   Nitrite   Protein   CREAT   Urine HCG        03/08/23 1313 Yellow   Clear   Negative   Trace   Negative   Negative                    Current Outpatient Medications on File Prior to Visit   Medication Sig Dispense Refill   • ibuprofen (ADVIL,MOTRIN) 200 MG tablet Advil 200 mg oral tablet take 1 tablet by oral route every 6 hours as needed for 30 days   Active       No current facility-administered medications on file prior to visit.        Assessment and Plan  Diagnoses and all orders for this visit:    1. Right lower quadrant abdominal pain (Primary)  -     POCT urinalysis dipstick, automated    2. Acute cystitis without hematuria  Comments:  We will continue on Macrobid for 3 days until urine culture comes back.  Discussed increase water intake, patient verbalized understanding  Orders:  -     Urine Culture - Urine, Urine, Clean Catch    Other orders  -     nitrofurantoin, macrocrystal-monohydrate, (Macrobid) 100 MG capsule; Take 1 capsule by mouth 2 (Two) Times a Day.  Dispense: 6 capsule; Refill: 0        Follow Up   Return for If symptoms do not improve new concerning symptoms.  Patient was given instructions and counseling regarding her condition or for health maintenance advice. Please see specific information pulled into the  AVS if appropriate.       Part of this note may be electronic transcription/translation of spoken language to printed text using the Dragon dictation system

## 2023-03-11 LAB — BACTERIA SPEC AEROBE CULT: ABNORMAL

## 2023-03-13 RX ORDER — SULFAMETHOXAZOLE AND TRIMETHOPRIM 800; 160 MG/1; MG/1
1 TABLET ORAL 2 TIMES DAILY
Qty: 14 TABLET | Refills: 0 | Status: SHIPPED | OUTPATIENT
Start: 2023-03-13

## 2023-03-27 ENCOUNTER — OFFICE VISIT (OUTPATIENT)
Dept: FAMILY MEDICINE CLINIC | Facility: CLINIC | Age: 58
End: 2023-03-27
Payer: COMMERCIAL

## 2023-03-27 VITALS
HEIGHT: 65 IN | DIASTOLIC BLOOD PRESSURE: 68 MMHG | WEIGHT: 162 LBS | OXYGEN SATURATION: 99 % | SYSTOLIC BLOOD PRESSURE: 118 MMHG | HEART RATE: 66 BPM | BODY MASS INDEX: 26.99 KG/M2 | TEMPERATURE: 98 F

## 2023-03-27 DIAGNOSIS — Z12.4 ENCOUNTER FOR PAPANICOLAOU SMEAR FOR CERVICAL CANCER SCREENING: ICD-10-CM

## 2023-03-27 DIAGNOSIS — Z13.220 SCREENING CHOLESTEROL LEVEL: ICD-10-CM

## 2023-03-27 DIAGNOSIS — M54.50 ACUTE LOW BACK PAIN, UNSPECIFIED BACK PAIN LATERALITY, UNSPECIFIED WHETHER SCIATICA PRESENT: Primary | ICD-10-CM

## 2023-03-27 LAB
BILIRUB BLD-MCNC: NEGATIVE MG/DL
CLARITY, POC: CLEAR
COLOR UR: YELLOW
GLUCOSE UR STRIP-MCNC: NEGATIVE MG/DL
KETONES UR QL: NEGATIVE
LEUKOCYTE EST, POC: NEGATIVE
NITRITE UR-MCNC: NEGATIVE MG/ML
PH UR: 6 [PH] (ref 5–8)
PROT UR STRIP-MCNC: NEGATIVE MG/DL
RBC # UR STRIP: NEGATIVE /UL
SP GR UR: 1.03 (ref 1–1.03)
UROBILINOGEN UR QL: NORMAL

## 2023-03-27 PROCEDURE — 87624 HPV HI-RISK TYP POOLED RSLT: CPT | Performed by: FAMILY MEDICINE

## 2023-03-27 PROCEDURE — G0123 SCREEN CERV/VAG THIN LAYER: HCPCS | Performed by: FAMILY MEDICINE

## 2023-03-27 RX ORDER — MIRABEGRON 50 MG/1
1 TABLET, FILM COATED, EXTENDED RELEASE ORAL DAILY
COMMUNITY
Start: 2023-03-03

## 2023-03-27 RX ORDER — ERGOCALCIFEROL 1.25 MG/1
1 CAPSULE ORAL WEEKLY
COMMUNITY
Start: 2023-03-13

## 2023-03-27 NOTE — PROGRESS NOTES
Answers for HPI/ROS submitted by the patient on 3/23/2023  What is the primary reason for your visit?: Abdominal Pain  Chronicity: recurrent  Onset: 1 to 4 weeks ago  Onset quality: gradual  Frequency: intermittently  Episode duration: 5 Hours  Progression since onset: waxing and waning  Pain location: RLQ  Pain - numeric: 5/10  Pain quality: dull  Radiates to: pelvis  anorexia: No  arthralgias: Yes  belching: No  constipation: No  diarrhea: No  dysuria: No  fever: No  flatus: Yes  frequency: Yes  headaches: No  hematochezia: No  hematuria: No  melena: No  myalgias: Yes  nausea: No  weight loss: No  vomiting: No  Aggravated by: movement  Relieved by: recumbency  Diagnostic workup: CT scan    Chief Complaint  Chief Complaint   Patient presents with   • Gynecologic Exam   • pain in between shoulder blades   • Feet are always cold   • Recent UTI - Low back pain still       HPI:  Basia Martin presents to [facility]     Pt reports she has been having low back pain and is not sure if she completely cleared her UTI that she was treated for earlier this month.  Pt was treated with Macrobid and reports it seems like it was getting better but then came back after she finished the medication.      Procedures     Past History:  Medical History: has a past medical history of Allergic (6 years old), Allergies, Anxiety, Chemical dependency, Depression, Hemorrhoids, Irritable bowel syndrome (Approx.   2004), Night sweats, Psoriasis, Psychiatric problem, and Sinus trouble.   Surgical History: has a past surgical history that includes Colonoscopy (2015); Dilation and curettage of uterus; Lymph node biopsy; and Pilonidal Cystectomy.   Family History: family history includes Diabetes in her mother; Heart disease in her maternal grandfather.   Social History: reports that she quit smoking about 2 years ago. Her smoking use included cigarettes. She has a 45.00 pack-year smoking history. She has never  "used smokeless tobacco. She reports current alcohol use of about 2.0 standard drinks per week. She reports that she does not use drugs.  Immunization History   Administered Date(s) Administered   • COVID-19 (MODERNA) 1st, 2nd, 3rd Dose Only 07/20/2022   • COVID-19 (PFIZER) PURPLE CAP 04/15/2021, 05/06/2021   • Covid-19 (Pfizer) Gray Cap 02/18/2022   • FluLaval/Fluzone >6mos 10/07/2022   • Influenza Quad Vaccine (Inpatient) 10/21/2020   • Shingrix 06/08/2022, 08/24/2022         Allergies: Pamprin max pain formula [apap-pamabrom-pyrilamine]     Medications:  Current Outpatient Medications on File Prior to Visit   Medication Sig Dispense Refill   • ibuprofen (ADVIL,MOTRIN) 200 MG tablet Advil 200 mg oral tablet take 1 tablet by oral route every 6 hours as needed for 30 days   Active     • Myrbetriq 50 MG tablet sustained-release 24 hour 24 hr tablet Take 50 mg by mouth Daily.     • vitamin D (ERGOCALCIFEROL) 1.25 MG (20082 UT) capsule capsule Take 1 capsule by mouth 1 (One) Time Per Week.     • [DISCONTINUED] sulfamethoxazole-trimethoprim (Bactrim DS) 800-160 MG per tablet Take 1 tablet by mouth 2 (Two) Times a Day. 14 tablet 0   • [DISCONTINUED] nitrofurantoin, macrocrystal-monohydrate, (Macrobid) 100 MG capsule Take 1 capsule by mouth 2 (Two) Times a Day. 6 capsule 0     No current facility-administered medications on file prior to visit.        Health Maintenance Due   Topic Date Due   • TDAP/TD VACCINES (1 - Tdap) Never done   • HEPATITIS C SCREENING  Never done   • ANNUAL PHYSICAL  Never done       Vital Signs:   Vitals:    03/27/23 1505   BP: 118/68   Pulse: 66   Temp: 98 °F (36.7 °C)   SpO2: 99%   Weight: 73.5 kg (162 lb)   Height: 165.1 cm (65\")      Body mass index is 26.96 kg/m².     ROS:  Review of Systems   Constitutional: Negative for fatigue, fever and unexpected weight loss.   HENT: Negative for congestion, ear pain and sinus pressure.    Respiratory: Negative for cough, chest tightness and shortness of " breath.    Cardiovascular: Negative for chest pain, palpitations and leg swelling.   Gastrointestinal: Negative for abdominal pain, constipation, diarrhea, nausea and vomiting.   Genitourinary: Positive for frequency. Negative for dysuria and hematuria.   Musculoskeletal: Positive for arthralgias and myalgias.   Neurological: Negative for speech difficulty, headache and confusion.   Psychiatric/Behavioral: Negative for agitation and behavioral problems.          Physical Exam  Vitals reviewed. Exam conducted with a chaperone present.   Constitutional:       Appearance: Normal appearance.   HENT:      Right Ear: Tympanic membrane normal.      Left Ear: Tympanic membrane normal.      Nose: Nose normal.   Eyes:      Extraocular Movements: Extraocular movements intact.      Conjunctiva/sclera: Conjunctivae normal.      Pupils: Pupils are equal, round, and reactive to light.   Cardiovascular:      Rate and Rhythm: Normal rate and regular rhythm.   Pulmonary:      Effort: Pulmonary effort is normal.      Breath sounds: Normal breath sounds.   Abdominal:      General: Bowel sounds are normal.   Genitourinary:     General: Normal vulva.      Pubic Area: No rash.       Vagina: Normal.      Cervix: Normal.      Uterus: Normal.       Adnexa: Right adnexa normal and left adnexa normal.      Rectum: Normal.   Musculoskeletal:         General: Normal range of motion.      Cervical back: Normal range of motion.   Skin:     General: Skin is warm and dry.   Neurological:      General: No focal deficit present.      Mental Status: She is alert and oriented to person, place, and time.   Psychiatric:         Mood and Affect: Mood normal.         Behavior: Behavior normal.          Result Review   Urine Culture - Urine, Urine, Clean Catch (03/08/2023 13:35)       Diagnoses and all orders for this visit:    1. Acute low back pain, unspecified back pain laterality, unspecified whether sciatica present (Primary)  -     POCT urinalysis  dipstick, manual  -     Comprehensive Metabolic Panel; Future    2. Encounter for Papanicolaou smear for cervical cancer screening  -     IgP, Aptima HPV  -     PDF Report    3. Screening cholesterol level  -     Lipid Panel; Future          Smoking Cessation:    Basia Martin  reports that she quit smoking about 2 years ago. Her smoking use included cigarettes. She has a 45.00 pack-year smoking history. She has never used smokeless tobacco.        Follow Up   Return in about 6 months (around 9/27/2023).  Patient was given instructions and counseling regarding her condition or for health maintenance advice. Please see specific information pulled into the AVS if appropriate.       Raquel Machado MD

## 2023-04-04 LAB
CYTOLOGIST CVX/VAG CYTO: NORMAL
CYTOLOGY CVX/VAG DOC CYTO: NORMAL
CYTOLOGY CVX/VAG DOC THIN PREP: NORMAL
DX ICD CODE: NORMAL
HIV 1 & 2 AB SER-IMP: NORMAL
HPV I/H RISK 4 DNA CVX QL PROBE+SIG AMP: NEGATIVE
Lab: NORMAL
OTHER STN SPEC: NORMAL
STAT OF ADQ CVX/VAG CYTO-IMP: NORMAL

## 2023-04-04 NOTE — PROGRESS NOTES
Please call and inform patient that her pap smear was normal and her HPV test was negative so we will repeat her test in 5 years.

## 2023-09-25 RX ORDER — MIRABEGRON 50 MG/1
TABLET, FILM COATED, EXTENDED RELEASE ORAL
Qty: 90 TABLET | Refills: 0 | Status: SHIPPED | OUTPATIENT
Start: 2023-09-25

## 2023-09-28 ENCOUNTER — OFFICE VISIT (OUTPATIENT)
Dept: FAMILY MEDICINE CLINIC | Facility: CLINIC | Age: 58
End: 2023-09-28
Payer: COMMERCIAL

## 2023-09-28 VITALS
HEART RATE: 81 BPM | HEIGHT: 65 IN | SYSTOLIC BLOOD PRESSURE: 128 MMHG | OXYGEN SATURATION: 99 % | DIASTOLIC BLOOD PRESSURE: 68 MMHG | RESPIRATION RATE: 14 BRPM | TEMPERATURE: 98.1 F | WEIGHT: 160.7 LBS | BODY MASS INDEX: 26.77 KG/M2

## 2023-09-28 DIAGNOSIS — R22.42 LOCALIZED SWELLING OF LEFT LOWER LEG: ICD-10-CM

## 2023-09-28 DIAGNOSIS — F17.211 CIGARETTE NICOTINE DEPENDENCE IN REMISSION: ICD-10-CM

## 2023-09-28 DIAGNOSIS — R10.31 RIGHT INGUINAL PAIN: Primary | ICD-10-CM

## 2023-09-28 DIAGNOSIS — Z23 NEED FOR INFLUENZA VACCINATION: ICD-10-CM

## 2023-09-28 LAB
BILIRUB BLD-MCNC: NEGATIVE MG/DL
CLARITY, POC: CLEAR
COLOR UR: YELLOW
GLUCOSE UR STRIP-MCNC: NEGATIVE MG/DL
KETONES UR QL: ABNORMAL
LEUKOCYTE EST, POC: NEGATIVE
NITRITE UR-MCNC: NEGATIVE MG/ML
PH UR: 5.5 [PH] (ref 5–8)
PROT UR STRIP-MCNC: NEGATIVE MG/DL
RBC # UR STRIP: NEGATIVE /UL
SP GR UR: 1.02 (ref 1–1.03)
UROBILINOGEN UR QL: ABNORMAL

## 2023-09-28 NOTE — PROGRESS NOTES
"Chief Complaint  Chief Complaint   Patient presents with    Vitamin D Deficiency    Shoulder Pain     Lt shoulder blade     Groin Pain     Rt       HPI:  Basia Martin presents to [facility]    The patient is experiencing pain in her scapulae that she attributes to \"bad lifting.\" She pushes carts at work and when she pushed it ahead of her, and it hit her in the back. This was a while ago.    Approximately 2 years ago, the patient fell onto a tool box which scratched her left lower extremity. Over the past week, she feels like there is fluid in her left upper extremity.    The patient thinks she has a hernia. She was throwing a blow-up mattress into a loft 4 months ago, and she felt a pull. She feels it is worsening, but it is more discomfort than pain.    The patient quit smoking almost 3 years ago.    The patient would like to be tested for STDs as she is planning on becoming sexually active again.    Procedures     Past History:  Medical History: has a past medical history of Allergic (6 years old), Allergies, Anxiety, Chemical dependency, Depression, Hemorrhoids, Irritable bowel syndrome (Approx.   2004), Night sweats, Psoriasis, Psychiatric problem, and Sinus trouble.   Surgical History: has a past surgical history that includes Colonoscopy (2015); Dilation and curettage of uterus; Lymph node biopsy; and Pilonidal Cystectomy.   Family History: family history includes Diabetes in her mother; Heart disease in her maternal grandfather.   Social History: reports that she quit smoking about 2 years ago. Her smoking use included cigarettes. She has a 45.00 pack-year smoking history. She has never used smokeless tobacco. She reports current alcohol use of about 2.0 standard drinks of alcohol per week. She reports that she does not use drugs.  Immunization History   Administered Date(s) Administered    COVID-19 (MODERNA) 1st,2nd,3rd Dose Monovalent 07/20/2022    COVID-19 (PFIZER) " "Purple Cap Monovalent 04/15/2021, 05/06/2021    Covid-19 (Pfizer) Gray Cap Monovalent 02/18/2022    Fluzone (or Fluarix & Flulaval for VFC) >6mos 10/21/2020, 10/07/2022, 09/28/2023    Influenza Quad Vaccine (Inpatient) 10/21/2020    Shingrix 06/08/2022, 08/24/2022         Allergies: Pamprin max pain formula [apap-pamabrom-pyrilamine]     Medications:  Current Outpatient Medications on File Prior to Visit   Medication Sig Dispense Refill    ibuprofen (ADVIL,MOTRIN) 200 MG tablet Advil 200 mg oral tablet take 1 tablet by oral route every 6 hours as needed for 30 days   Active      Myrbetriq 50 MG tablet sustained-release 24 hour 24 hr tablet Take 1 tablet by mouth once daily 90 tablet 0    vitamin D (ERGOCALCIFEROL) 1.25 MG (52288 UT) capsule capsule Take 1 capsule by mouth 1 (One) Time Per Week.       No current facility-administered medications on file prior to visit.        Health Maintenance Due   Topic Date Due    TDAP/TD VACCINES (1 - Tdap) Never done    HEPATITIS C SCREENING  Never done    COVID-19 Vaccine (5 - 2023-24 season) 09/01/2023       Vital Signs:   Vitals:    09/28/23 1611   BP: 128/68   Pulse: 81   Resp: 14   Temp: 98.1 øF (36.7 øC)   SpO2: 99%   Weight: 72.9 kg (160 lb 11.2 oz)   Height: 165.1 cm (65\")      Body mass index is 26.74 kg/mý.     ROS:  Review of Systems   Constitutional:  Negative for fever and unexpected weight loss.   Gastrointestinal:  Positive for abdominal pain. Negative for constipation, diarrhea, nausea and vomiting.   Genitourinary:  Positive for frequency. Negative for dysuria and hematuria.   Musculoskeletal:  Positive for arthralgias. Negative for myalgias.          Physical Exam  Vitals reviewed.   Constitutional:       Appearance: Normal appearance.   HENT:      Right Ear: Tympanic membrane normal.      Left Ear: Tympanic membrane normal.      Nose: Nose normal.   Eyes:      Extraocular Movements: Extraocular movements intact.      Conjunctiva/sclera: Conjunctivae normal. "      Pupils: Pupils are equal, round, and reactive to light.   Cardiovascular:      Rate and Rhythm: Normal rate and regular rhythm.   Pulmonary:      Effort: Pulmonary effort is normal.      Breath sounds: Normal breath sounds.   Abdominal:      General: Bowel sounds are normal.   Musculoskeletal:         General: Normal range of motion.      Cervical back: Normal range of motion.   Skin:     General: Skin is warm and dry.   Neurological:      General: No focal deficit present.      Mental Status: She is alert and oriented to person, place, and time.   Psychiatric:         Mood and Affect: Mood normal.         Behavior: Behavior normal.          Result Review        Diagnoses and all orders for this visit:    1. Right inguinal pain (Primary)  -     POCT urinalysis dipstick, manual  -     US Abdomen Limited; Future    2. Need for influenza vaccination  -     Fluzone (or Fluarix & Flulaval for VFC) >6 Mos ([id number])    3. Cigarette nicotine dependence in remission  -      CT Chest Low Dose Cancer Screening WO; Future    4. Localized swelling of left lower leg  -     Duplex Venous Lower Extremity - Left CAR; Future      Possible hernia  An ultrasound will be ordered.    Shoulder pain  Recommend physical therapy or deep tissue massage.    Left lower extremity edema  Ultrasound to rule out thrombus.    Smoking history  Low-dose CT will be ordered.    Health maintenance  I will order STD testing.    Smoking Cessation:    Basia Martin  reports that she quit smoking about 2 years ago. Her smoking use included cigarettes. She has a 45.00 pack-year smoking history. She has never used smokeless tobacco.          Follow Up   Return in about 6 months (around 3/28/2024).  Patient was given instructions and counseling regarding her condition or for health maintenance advice. Please see specific information pulled into the AVS if appropriate.       Marylin Mcnair submitted by the patient for this visit:  Primary  Reason for Visit (Submitted on 9/27/2023)  What is the primary reason for your visit?: Abdominal Pain  Abdominal Pain Questionnaire (Submitted on 9/27/2023)  Chief Complaint: Abdominal pain  Chronicity: new  Onset: 1 to 4 weeks ago  Onset quality: gradual  Frequency: daily  Episode duration: 1 Hours  Progression since onset: gradually worsening  Pain location: RLQ  Pain - numeric: 2/10  Pain quality: dull  Radiates to: left flank, right flank  anorexia: No  belching: No  flatus: No  headaches: No  hematochezia: No  melena: No  Aggravated by: certain positions, movement  Relieved by: being still  Diagnostic workup: CT scan      Transcribed from ambient dictation for Raquel Machado MD by Karen Mcmillan.  09/28/23   19:44 EDT    Patient or patient representative verbalized consent to the visit recording.  I have personally performed the services described in this document as transcribed by the above individual, and it is both accurate and complete.

## 2023-10-14 ENCOUNTER — HOSPITAL ENCOUNTER (OUTPATIENT)
Dept: ULTRASOUND IMAGING | Facility: HOSPITAL | Age: 58
Discharge: HOME | End: 2023-10-14
Admitting: FAMILY MEDICINE
Payer: COMMERCIAL

## 2023-10-14 DIAGNOSIS — R10.31 RIGHT INGUINAL PAIN: ICD-10-CM

## 2023-10-14 PROCEDURE — 76882 US LMTD JT/FCL EVL NVASC XTR: CPT

## 2023-10-15 NOTE — PROGRESS NOTES
Please call and inform patient that there was no abnormality seen in the area of the lump that she was feeling.

## 2023-10-27 ENCOUNTER — HOSPITAL ENCOUNTER (OUTPATIENT)
Dept: CARDIOLOGY | Facility: HOSPITAL | Age: 58
Discharge: HOME | End: 2023-10-27
Payer: COMMERCIAL

## 2023-10-27 DIAGNOSIS — R22.42 LOCALIZED SWELLING OF LEFT LOWER LEG: ICD-10-CM

## 2023-10-27 LAB
BH CV LOWER VASCULAR LEFT COMMON FEMORAL AUGMENT: NORMAL
BH CV LOWER VASCULAR LEFT COMMON FEMORAL COMPETENT: NORMAL
BH CV LOWER VASCULAR LEFT COMMON FEMORAL COMPRESS: NORMAL
BH CV LOWER VASCULAR LEFT COMMON FEMORAL PHASIC: NORMAL
BH CV LOWER VASCULAR LEFT COMMON FEMORAL SPONT: NORMAL
BH CV LOWER VASCULAR LEFT DISTAL FEMORAL COMPRESS: NORMAL
BH CV LOWER VASCULAR LEFT GASTRONEMIUS COMPRESS: NORMAL
BH CV LOWER VASCULAR LEFT GREATER SAPH AK COMPRESS: NORMAL
BH CV LOWER VASCULAR LEFT GREATER SAPH BK COMPRESS: NORMAL
BH CV LOWER VASCULAR LEFT LESSER SAPH COMPRESS: NORMAL
BH CV LOWER VASCULAR LEFT MID FEMORAL AUGMENT: NORMAL
BH CV LOWER VASCULAR LEFT MID FEMORAL COMPETENT: NORMAL
BH CV LOWER VASCULAR LEFT MID FEMORAL COMPRESS: NORMAL
BH CV LOWER VASCULAR LEFT MID FEMORAL PHASIC: NORMAL
BH CV LOWER VASCULAR LEFT MID FEMORAL SPONT: NORMAL
BH CV LOWER VASCULAR LEFT PERONEAL COMPRESS: NORMAL
BH CV LOWER VASCULAR LEFT POPLITEAL AUGMENT: NORMAL
BH CV LOWER VASCULAR LEFT POPLITEAL COMPETENT: NORMAL
BH CV LOWER VASCULAR LEFT POPLITEAL COMPRESS: NORMAL
BH CV LOWER VASCULAR LEFT POPLITEAL PHASIC: NORMAL
BH CV LOWER VASCULAR LEFT POPLITEAL SPONT: NORMAL
BH CV LOWER VASCULAR LEFT POSTERIOR TIBIAL COMPRESS: NORMAL
BH CV LOWER VASCULAR LEFT PROXIMAL FEMORAL COMPRESS: NORMAL
BH CV LOWER VASCULAR LEFT SAPHENOFEMORAL JUNCTION COMPRESS: NORMAL
BH CV LOWER VASCULAR RIGHT COMMON FEMORAL AUGMENT: NORMAL
BH CV LOWER VASCULAR RIGHT COMMON FEMORAL COMPETENT: NORMAL
BH CV LOWER VASCULAR RIGHT COMMON FEMORAL COMPRESS: NORMAL
BH CV LOWER VASCULAR RIGHT COMMON FEMORAL PHASIC: NORMAL
BH CV LOWER VASCULAR RIGHT COMMON FEMORAL SPONT: NORMAL

## 2023-10-27 PROCEDURE — 93971 EXTREMITY STUDY: CPT

## 2023-12-27 RX ORDER — MIRABEGRON 50 MG/1
TABLET, FILM COATED, EXTENDED RELEASE ORAL
Qty: 90 TABLET | Refills: 0 | Status: SHIPPED | OUTPATIENT
Start: 2023-12-27

## 2024-01-23 NOTE — TELEPHONE ENCOUNTER
"     Caller: Basia Martin \"Juana\"    Relationship: Self    Best call back number: [phone]    Requested Prescriptions:   Requested Prescriptions     Pending Prescriptions Disp Refills    Mirabegron ER (Myrbetriq) 50 MG tablet sustained-release 24 hour 24 hr tablet 90 tablet 0     Sig: Take 50 mg by mouth Daily.        Pharmacy where request should be sent: [address] - [phone]  - [phone]     Last office visit with prescribing clinician: 9/28/2023   Last telemedicine visit with prescribing clinician: Visit date not found   Next office visit with prescribing clinician: 3/28/2024     Additional details provided by patient:     THE PATIENT IS OUT OF THIS MEDICATION. SHE SAID PCP DAMIEN WAS GOING TO CHANGE THIS MEDICATION        Does the patient have less than a 3 day supply:  [x] Yes  [] No    Would you like a call back once the refill request has been completed: [] Yes [x] No    If the office needs to give you a call back, can they leave a voicemail: [] Yes [x] No    Félix Mcnamara Rep   01/23/24 09:14 EST      "

## 2024-03-28 ENCOUNTER — OFFICE VISIT (OUTPATIENT)
Dept: FAMILY MEDICINE CLINIC | Facility: CLINIC | Age: 59
End: 2024-03-28
Payer: COMMERCIAL

## 2024-03-28 VITALS
TEMPERATURE: 98.3 F | HEART RATE: 62 BPM | HEIGHT: 65 IN | BODY MASS INDEX: 26.86 KG/M2 | WEIGHT: 161.2 LBS | OXYGEN SATURATION: 99 % | SYSTOLIC BLOOD PRESSURE: 118 MMHG | DIASTOLIC BLOOD PRESSURE: 68 MMHG | RESPIRATION RATE: 18 BRPM

## 2024-03-28 DIAGNOSIS — R73.9 HYPERGLYCEMIA: Primary | ICD-10-CM

## 2024-03-28 DIAGNOSIS — M15.9 PRIMARY OSTEOARTHRITIS INVOLVING MULTIPLE JOINTS: ICD-10-CM

## 2024-03-28 DIAGNOSIS — R79.89 LOW VITAMIN B12 LEVEL: ICD-10-CM

## 2024-03-28 DIAGNOSIS — R79.89 ABNORMAL CBC: ICD-10-CM

## 2024-03-28 DIAGNOSIS — R10.2 PELVIC PAIN IN FEMALE: ICD-10-CM

## 2024-03-28 DIAGNOSIS — E55.9 VITAMIN D DEFICIENCY: ICD-10-CM

## 2024-03-28 LAB
25(OH)D3 SERPL-MCNC: 32.3 NG/ML (ref 30–100)
ALBUMIN SERPL-MCNC: 4.3 G/DL (ref 3.5–5.2)
ALBUMIN/GLOB SERPL: 1.6 G/DL
ALP SERPL-CCNC: 65 U/L (ref 39–117)
ALT SERPL W P-5'-P-CCNC: 16 U/L (ref 1–33)
ANION GAP SERPL CALCULATED.3IONS-SCNC: 8 MMOL/L (ref 5–15)
AST SERPL-CCNC: 15 U/L (ref 1–32)
BASOPHILS # BLD AUTO: 0.08 10*3/MM3 (ref 0–0.2)
BASOPHILS NFR BLD AUTO: 1.2 % (ref 0–1.5)
BILIRUB SERPL-MCNC: 0.3 MG/DL (ref 0–1.2)
BUN SERPL-MCNC: 14 MG/DL (ref 8–23)
BUN/CREAT SERPL: 21.2 (ref 7–25)
CALCIUM SPEC-SCNC: 9.2 MG/DL (ref 8.6–10.5)
CHLORIDE SERPL-SCNC: 103 MMOL/L (ref 98–107)
CO2 SERPL-SCNC: 27 MMOL/L (ref 22–29)
CREAT SERPL-MCNC: 0.66 MG/DL (ref 0.57–1)
DEPRECATED RDW RBC AUTO: 38.5 FL (ref 37–54)
EGFRCR SERPLBLD CKD-EPI 2021: 100.6 ML/MIN/1.73
EOSINOPHIL # BLD AUTO: 0.22 10*3/MM3 (ref 0–0.4)
EOSINOPHIL NFR BLD AUTO: 3.2 % (ref 0.3–6.2)
ERYTHROCYTE [DISTWIDTH] IN BLOOD BY AUTOMATED COUNT: 11.7 % (ref 12.3–15.4)
FOLATE SERPL-MCNC: 13.1 NG/ML (ref 4.78–24.2)
GLOBULIN UR ELPH-MCNC: 2.7 GM/DL
GLUCOSE SERPL-MCNC: 81 MG/DL (ref 65–99)
HBA1C MFR BLD: 5.6 % (ref 4.8–5.6)
HCT VFR BLD AUTO: 37.7 % (ref 34–46.6)
HGB BLD-MCNC: 12.8 G/DL (ref 12–15.9)
IMM GRANULOCYTES # BLD AUTO: 0.01 10*3/MM3 (ref 0–0.05)
IMM GRANULOCYTES NFR BLD AUTO: 0.1 % (ref 0–0.5)
LYMPHOCYTES # BLD AUTO: 2.64 10*3/MM3 (ref 0.7–3.1)
LYMPHOCYTES NFR BLD AUTO: 38.4 % (ref 19.6–45.3)
MCH RBC QN AUTO: 30.6 PG (ref 26.6–33)
MCHC RBC AUTO-ENTMCNC: 34 G/DL (ref 31.5–35.7)
MCV RBC AUTO: 90.2 FL (ref 79–97)
MONOCYTES # BLD AUTO: 0.35 10*3/MM3 (ref 0.1–0.9)
MONOCYTES NFR BLD AUTO: 5.1 % (ref 5–12)
NEUTROPHILS NFR BLD AUTO: 3.57 10*3/MM3 (ref 1.7–7)
NEUTROPHILS NFR BLD AUTO: 52 % (ref 42.7–76)
NRBC BLD AUTO-RTO: 0 /100 WBC (ref 0–0.2)
PLATELET # BLD AUTO: 294 10*3/MM3 (ref 140–450)
PMV BLD AUTO: 9.5 FL (ref 6–12)
POTASSIUM SERPL-SCNC: 4.7 MMOL/L (ref 3.5–5.2)
PROT SERPL-MCNC: 7 G/DL (ref 6–8.5)
RBC # BLD AUTO: 4.18 10*6/MM3 (ref 3.77–5.28)
SODIUM SERPL-SCNC: 138 MMOL/L (ref 136–145)
VIT B12 BLD-MCNC: 457 PG/ML (ref 211–946)
WBC NRBC COR # BLD AUTO: 6.87 10*3/MM3 (ref 3.4–10.8)

## 2024-03-28 PROCEDURE — 82746 ASSAY OF FOLIC ACID SERUM: CPT | Performed by: FAMILY MEDICINE

## 2024-03-28 PROCEDURE — 85025 COMPLETE CBC W/AUTO DIFF WBC: CPT | Performed by: FAMILY MEDICINE

## 2024-03-28 PROCEDURE — 82607 VITAMIN B-12: CPT | Performed by: FAMILY MEDICINE

## 2024-03-28 PROCEDURE — 83036 HEMOGLOBIN GLYCOSYLATED A1C: CPT | Performed by: FAMILY MEDICINE

## 2024-03-28 PROCEDURE — 82306 VITAMIN D 25 HYDROXY: CPT | Performed by: FAMILY MEDICINE

## 2024-03-28 PROCEDURE — 80053 COMPREHEN METABOLIC PANEL: CPT | Performed by: FAMILY MEDICINE

## 2024-03-28 RX ORDER — MELOXICAM 7.5 MG/1
7.5 TABLET ORAL DAILY
Qty: 90 TABLET | Refills: 1 | Status: SHIPPED | OUTPATIENT
Start: 2024-03-28 | End: 2024-06-26

## 2024-03-28 NOTE — PROGRESS NOTES
Chief Complaint  Chief Complaint   Patient presents with    Follow-up    Cold Feet     Cold feet       HPI:  Basia Martin presents to [facility]  The patient is a 58-year-old female who presents for evaluation of multiple medical concerns.    Her feet have been cold for a while and seem to be getting worse. She has no strength in her legs to get up and down anymore if she is down on the floor. Her legs are sore and tired all the time from working. 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 She ascends and descends stairs. Her legs are getting weak to get up off the floor. She does not take any medication for her arthritis pain. She is right-handed.    She eats too much cake and sugar. She has at least 8 puffs of a donut every day. She quit drinking Coke 6 months ago. She has switched over to M & M's. She bought some sugar-free candy this past week. She takes a urinary pill in the morning.    She still has occasional lower pelvic pain. She denies any vaginal bleeding. She still has her uterus and ovaries intact. She gets major hot flashes. She still wakes up in the middle of the night. There are times when she feels low back discomfort when she begins her menstrual cycle. She denies any discomfort with sex.    She has a bowel movement daily.    Her mother had valley fever.    Procedures     Past History:  Medical History: has a past medical history of Allergic (6 years old), Allergies, Anxiety, Chemical dependency, Depression, Hemorrhoids, Irritable bowel syndrome (Approx.   2004), Night sweats, Psoriasis, Psychiatric problem, and Sinus trouble.   Surgical History: has a past surgical history that includes Colonoscopy (2015); Dilation and curettage of uterus; Lymph node biopsy; and Pilonidal Cystectomy.   Family History: family history includes Diabetes in her mother; Heart disease in her maternal grandfather.   Social History: reports that she quit smoking  "about 3 years ago. Her smoking use included cigarettes. She started smoking about 48 years ago. She has a 45 pack-year smoking history. She has never used smokeless tobacco. She reports current alcohol use of about 2.0 standard drinks of alcohol per week. She reports current drug use. Drug: Marijuana.  Immunization History   Administered Date(s) Administered    COVID-19 (MODERNA) 1st,2nd,3rd Dose Monovalent 07/20/2022    COVID-19 (PFIZER) Purple Cap Monovalent 04/15/2021, 05/06/2021    Covid-19 (Pfizer) Gray Cap Monovalent 02/18/2022    Fluzone (or Fluarix & Flulaval for VFC) >6mos 10/21/2020, 10/07/2022, 09/28/2023    Influenza Quad Vaccine (Inpatient) 10/21/2020    Shingrix 06/08/2022, 08/24/2022         Allergies: Pamprin max pain formula [apap-pamabrom-pyrilamine]     Medications:  Current Outpatient Medications on File Prior to Visit   Medication Sig Dispense Refill    Mirabegron ER (Myrbetriq) 50 MG tablet sustained-release 24 hour 24 hr tablet Take 50 mg by mouth Daily for 90 days. 90 tablet 1    vitamin D (ERGOCALCIFEROL) 1.25 MG (41479 UT) capsule capsule Take 1 capsule by mouth 1 (One) Time Per Week.       No current facility-administered medications on file prior to visit.        Health Maintenance Due   Topic Date Due    TDAP/TD VACCINES (1 - Tdap) Never done    COLORECTAL CANCER SCREENING  06/10/2018    HEPATITIS C SCREENING  Never done    RSV Vaccine - Adults (1 - 1-dose 60+ series) Never done    COVID-19 Vaccine (5 - 2023-24 season) 09/01/2023    LUNG CANCER SCREENING  11/02/2023    ANNUAL PHYSICAL  03/27/2024       Vital Signs:   Vitals:    03/28/24 0956   BP: 118/68   BP Location: Left arm   Patient Position: Sitting   Cuff Size: Adult   Pulse: 62   Resp: 18   Temp: 98.3 °F (36.8 °C)   SpO2: 99%   Weight: 73.1 kg (161 lb 3.2 oz)   Height: 165.1 cm (65\")      Body mass index is 26.83 kg/m².     ROS:  Review of Systems   Constitutional:  Negative for fatigue and fever.   HENT:  Negative for " congestion, ear pain and sinus pressure.    Respiratory:  Negative for cough, chest tightness and shortness of breath.    Cardiovascular:  Negative for chest pain, palpitations and leg swelling.   Gastrointestinal:  Negative for abdominal pain and diarrhea.   Genitourinary:  Negative for dysuria and frequency.   Neurological:  Negative for speech difficulty, headache and confusion.   Psychiatric/Behavioral:  Negative for agitation and behavioral problems.           Physical Exam  Vitals reviewed.   Constitutional:       Appearance: Normal appearance.   HENT:      Right Ear: Tympanic membrane normal.      Left Ear: Tympanic membrane normal.      Nose: Nose normal.   Eyes:      Extraocular Movements: Extraocular movements intact.      Conjunctiva/sclera: Conjunctivae normal.      Pupils: Pupils are equal, round, and reactive to light.   Cardiovascular:      Rate and Rhythm: Normal rate and regular rhythm.   Pulmonary:      Effort: Pulmonary effort is normal.      Breath sounds: Normal breath sounds.   Abdominal:      General: Bowel sounds are normal.   Musculoskeletal:         General: Normal range of motion.      Cervical back: Normal range of motion.   Skin:     General: Skin is warm and dry.   Neurological:      General: No focal deficit present.      Mental Status: She is alert and oriented to person, place, and time.   Psychiatric:         Mood and Affect: Mood normal.         Behavior: Behavior normal.          Result Review   Laboratory Studies  Labs from 2023 were reviewed with the patient.    Imaging  Ultrasound of the right lower abdomen was reviewed with the patient.     Diagnoses and all orders for this visit:    1. Hyperglycemia (Primary)  -     Comprehensive Metabolic Panel  -     Hemoglobin A1c    2. Abnormal CBC  -     CBC Auto Differential    3. Vitamin D deficiency  -     Vitamin D,25-Hydroxy    4. Low vitamin B12 level  -     Vitamin B12 & Folate    5. Pelvic pain in female  -     US Non-ob  Transvaginal; Future    6. Primary osteoarthritis involving multiple joints  -     meloxicam (Mobic) 7.5 MG tablet; Take 1 tablet by mouth Daily for 90 days.  Dispense: 90 tablet; Refill: 1    1. Paresthesia, burning, and cold feet.  Her circulation was checked, and there was no clot. Her ultrasound of the right lower abdomen was normal. Her sugars were slightly elevated in 2023. I will order blood work to check her vitamin levels, sugar, and cholesterol. I will order a mammogram.    2. Pelvic pain.  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 I will order a transvaginal ultrasound.    3. Arthritis.  I will prescribe meloxicam 1 pill daily.    4. Health maintenance.  She is healthy. I will order a low-dose CT scan.    Follow-up  The patient will follow up in 3 months.    BMI is >= 25 and <30. (Overweight) The following options were offered after discussion;: nutrition counseling/recommendations       Smoking Cessation:    Basia Martin  reports that she quit smoking about 3 years ago. Her smoking use included cigarettes. She started smoking about 48 years ago. She has a 45 pack-year smoking history. She has never used smokeless tobacco.     Follow Up   Return in about 3 months (around 6/28/2024).  Patient was given instructions and counseling regarding her condition or for health maintenance advice. Please see specific information pulled into the AVS if appropriate.       Raquel Machado MD    Transcribed from ambient dictation for Raquel Machado MD by Faye Chung.  03/28/24   11:46 EDT    Patient or patient representative verbalized consent to the visit recording.  I have personally performed the services described in this document as transcribed by the above individual, and it is both accurate and complete.

## 2024-04-24 ENCOUNTER — HOSPITAL ENCOUNTER (OUTPATIENT)
Dept: ULTRASOUND IMAGING | Facility: HOSPITAL | Age: 59
Discharge: HOME | End: 2024-04-24
Admitting: FAMILY MEDICINE
Payer: COMMERCIAL

## 2024-04-24 DIAGNOSIS — R10.2 PELVIC PAIN IN FEMALE: ICD-10-CM

## 2024-04-24 PROCEDURE — 76830 TRANSVAGINAL US NON-OB: CPT

## 2024-05-01 ENCOUNTER — HOSPITAL ENCOUNTER (OUTPATIENT)
Dept: CT IMAGING | Facility: HOSPITAL | Age: 59
Discharge: HOME | End: 2024-05-01
Admitting: FAMILY MEDICINE
Payer: COMMERCIAL

## 2024-05-01 DIAGNOSIS — F17.211 CIGARETTE NICOTINE DEPENDENCE IN REMISSION: ICD-10-CM

## 2024-05-01 PROCEDURE — 71271 CT THORAX LUNG CANCER SCR C-: CPT

## 2024-06-18 ENCOUNTER — OFFICE VISIT (OUTPATIENT)
Dept: FAMILY MEDICINE CLINIC | Facility: CLINIC | Age: 59
End: 2024-06-18
Payer: COMMERCIAL

## 2024-06-18 VITALS
SYSTOLIC BLOOD PRESSURE: 138 MMHG | TEMPERATURE: 98.4 F | HEIGHT: 65 IN | HEART RATE: 65 BPM | WEIGHT: 161.4 LBS | RESPIRATION RATE: 18 BRPM | DIASTOLIC BLOOD PRESSURE: 88 MMHG | OXYGEN SATURATION: 97 % | BODY MASS INDEX: 26.89 KG/M2

## 2024-06-18 DIAGNOSIS — D25.9 UTERINE LEIOMYOMA, UNSPECIFIED LOCATION: ICD-10-CM

## 2024-06-18 DIAGNOSIS — N94.89 ADNEXAL MASS: ICD-10-CM

## 2024-06-18 DIAGNOSIS — R10.2 PELVIC PAIN IN FEMALE: ICD-10-CM

## 2024-06-18 DIAGNOSIS — Z13.820 OSTEOPOROSIS SCREENING: Primary | ICD-10-CM

## 2024-06-18 DIAGNOSIS — M15.9 PRIMARY OSTEOARTHRITIS INVOLVING MULTIPLE JOINTS: ICD-10-CM

## 2024-06-18 DIAGNOSIS — Z12.11 SCREENING FOR COLON CANCER: ICD-10-CM

## 2024-06-18 PROCEDURE — 99214 OFFICE O/P EST MOD 30 MIN: CPT | Performed by: FAMILY MEDICINE

## 2024-06-18 RX ORDER — MIRABEGRON 50 MG/1
1 TABLET, FILM COATED, EXTENDED RELEASE ORAL DAILY
COMMUNITY
Start: 2024-06-05

## 2024-06-18 RX ORDER — MELOXICAM 15 MG/1
15 TABLET ORAL DAILY
Qty: 90 TABLET | Refills: 1 | Status: SHIPPED | OUTPATIENT
Start: 2024-06-18 | End: 2024-09-16

## 2024-06-18 NOTE — PROGRESS NOTES
Chief Complaint  Hyperglycemia (3mfu)    Subjective        Basia Martin presents to [facility]  History of Present Illness  The patient presents for evaluation of multiple medical concerns.    The patient continues her regimen of Myrbetriq, which she reports as beneficial, enabling her to venture in public without necessitating bathroom breaks.    The patient has been supplementing her diet with vitamin D gummies, consuming approximately three jars of vitamin D every few days. She maintains an active lifestyle and continues to work five days a week.    The patient's leg pain has shown improvement, with no burning sensation reported. However, she reports persistent coldness and heaviness in her feet, particularly upon waking. She attributes these symptoms to her 8-hour workday. She has been utilizing compression socks and insoles, which have provided some relief.    The patient underwent a thorough vaginal examination in 04/2024, during which a cystic area was identified on the right side of her uterus, potentially related to her ovary. A follow-up ultrasound was recommended in three months. She speculates that her pelvic pain may be related to her ovaries, as she has never been pregnant. Approximately 20 years ago, she underwent a dilation and curettage procedure for the left side of her uterus, which resulted in a blockage. She reports right-sided pelvic pain.    The patient reports regular bowel movements. She is uncertain about the date of her last colonoscopy.    The patient reports an unusual itching sensation under her breasts.   She was a smoker when she was 38 years old.        Past Medical History:   Diagnosis Date    Allergic 6 years old    Better these days -  worst when i was young    Allergies     Anxiety     Chemical dependency     Depression     Hemorrhoids     Irritable bowel syndrome Approx.   2004    Night sweats     Psoriasis     Psychiatric problem     Sinus  "trouble       Family History   Problem Relation Age of Onset    Diabetes Mother     Heart disease Maternal Grandfather       Social History     Socioeconomic History    Marital status:    Tobacco Use    Smoking status: Former     Current packs/day: 0.00     Average packs/day: 1 pack/day for 45.0 years (45.0 ttl pk-yrs)     Types: Cigarettes     Start date: 1/1/1976     Quit date: 1/1/2021     Years since quitting: 3.5    Smokeless tobacco: Never   Vaping Use    Vaping status: Every Day    Substances: Nicotine, THC    Devices: Pre-filled pod   Substance and Sexual Activity    Alcohol use: Yes     Alcohol/week: 2.0 standard drinks of alcohol     Types: 2 Glasses of wine per week     Comment: current some day    Drug use: Yes     Types: Marijuana    Sexual activity: Not Currently     Partners: Female     Birth control/protection: Abstinence, Post-menopausal      Objective   Vital Signs:  /88 (BP Location: Left arm, Patient Position: Sitting, Cuff Size: Adult)   Pulse 65   Temp 98.4 °F (36.9 °C) (Oral)   Resp 18   Ht 165.1 cm (65\")   Wt 73.2 kg (161 lb 6.4 oz)   SpO2 97%   BMI 26.86 kg/m²   Estimated body mass index is 26.86 kg/m² as calculated from the following:    Height as of this encounter: 165.1 cm (65\").    Weight as of this encounter: 73.2 kg (161 lb 6.4 oz).       Review of Systems   Constitutional:  Negative for activity change, chills, fatigue and fever.   HENT:  Negative for congestion, sinus pressure, sinus pain and sore throat.    Eyes:  Negative for discharge and redness.   Respiratory:  Negative for cough and chest tightness.    Cardiovascular:  Negative for chest pain, palpitations and leg swelling.   Gastrointestinal:  Negative for abdominal pain and diarrhea.   Endocrine: Negative for cold intolerance and heat intolerance.   Genitourinary:  Negative for difficulty urinating and dysuria.   Musculoskeletal:  Negative for gait problem and neck stiffness.   Skin:  Negative for pallor " and rash.   Neurological:  Negative for dizziness and headaches.   Psychiatric/Behavioral:  Negative for agitation, behavioral problems and confusion.       Physical Exam  Vitals reviewed.   Constitutional:       Appearance: Normal appearance.   HENT:      Right Ear: Tympanic membrane normal.      Left Ear: Tympanic membrane normal.      Nose: Nose normal.   Eyes:      Extraocular Movements: Extraocular movements intact.      Conjunctiva/sclera: Conjunctivae normal.      Pupils: Pupils are equal, round, and reactive to light.   Cardiovascular:      Rate and Rhythm: Normal rate and regular rhythm.   Pulmonary:      Effort: Pulmonary effort is normal.      Breath sounds: Normal breath sounds.   Abdominal:      General: Bowel sounds are normal.   Musculoskeletal:         General: Normal range of motion.      Cervical back: Normal range of motion.   Skin:     General: Skin is warm and dry.   Neurological:      General: No focal deficit present.      Mental Status: She is alert and oriented to person, place, and time.   Psychiatric:         Mood and Affect: Mood normal.         Behavior: Behavior normal.          Physical Exam        Result Review       Results  Laboratory Studies  Blood level indicates anemia. Vitamin D levels are normal. A1c is normal.             Assessment and Plan     Diagnoses and all orders for this visit:    1. Osteoporosis screening (Primary)  -     Dexa Bone Density, Axial (Every 2 Years); Future    2. Screening for colon cancer  -     Ambulatory Referral to Gastroenterology    3. Primary osteoarthritis involving multiple joints  -     meloxicam (Mobic) 15 MG tablet; Take 1 tablet by mouth Daily for 90 days.  Dispense: 90 tablet; Refill: 1    4. Pelvic pain in female  -     Ambulatory Referral to Obstetrics / Gynecology  -     US Non-ob Transvaginal; Future    5. Uterine leiomyoma, unspecified location  -     Ambulatory Referral to Obstetrics / Gynecology  -     US Non-ob Transvaginal;  Future    6. Adnexal mass  -     US Non-ob Transvaginal; Future      Assessment & Plan  1. Vitamin D deficiency.  The patient's Vitamin D levels have shown improvement, as evidenced by her improved Vitamin D levels. The patient is advised to persist with her current Vitamin D regimen. A bone density test has been ordered.    2. Pelvic pain.  A cyst on the right side of her uterus is suspected to be related to her ovary. A follow-up transvaginal ultrasound has been ordered. The patient will be provided with samples of Myrbetriq.    3. Health maintenance.  The patient's A1c levels are within the normal range, indicating no diabetes. Hepatitis C screening will be conducted. A colonoscopy has been ordered. A bone density test has been ordered.    4. Yeast infection.  The patient has been advised to sprinkle a powder to the affected area for a few days.    5. Arthritis and knee pain.  The patient's muscle strength is progressively weakening. The patient will increase her meloxicam dosage from 7.5 mg to 15 mg once daily.       I spent 35 minutes caring for Basia on this date of service. This time includes time spent by me in the following activities:reviewing tests  Follow Up     No follow-ups on file.  Patient was given instructions and counseling regarding her condition or for health maintenance advice. Please see specific information pulled into the AVS if appropriate.   Patient or patient representative verbalized consent for the use of Ambient Listening during the visit with  Raquel Machado MD for chart documentation. 7/7/2024  15:26 EDT    Raquel Machado MD      Answers submitted by the patient for this visit:  Other (Submitted on 6/14/2024)  Please describe your symptoms.: Dr. Suero scheduled follow up appointment  Have you had these symptoms before?: No  How long have you been having these symptoms?: 1-4 days  Primary Reason for Visit (Submitted on 6/14/2024)  What is the primary reason for your visit?:  Other

## 2024-06-20 ENCOUNTER — TELEPHONE (OUTPATIENT)
Dept: GASTROENTEROLOGY | Facility: CLINIC | Age: 59
End: 2024-06-20
Payer: COMMERCIAL

## 2024-07-12 ENCOUNTER — TELEPHONE (OUTPATIENT)
Dept: OBSTETRICS AND GYNECOLOGY | Facility: CLINIC | Age: 59
End: 2024-07-12
Payer: COMMERCIAL

## 2024-07-12 NOTE — TELEPHONE ENCOUNTER
[id number]: lm next appt in system, if this day or time does not work please call the office to reschedule

## 2024-09-09 ENCOUNTER — TELEPHONE (OUTPATIENT)
Dept: FAMILY MEDICINE CLINIC | Facility: CLINIC | Age: 59
End: 2024-09-09

## 2024-09-09 NOTE — TELEPHONE ENCOUNTER
"    Caller: Basia Martin \"Juana\"    Relationship to patient: Self    Best call back number: [phone]    Patient is needing: PATIENT CALLED JUST WANTING TO LET MD QUEZADA KNOW SHE RECENTLY LOST HER JOB AND DUE TO THIS SHE CURRENTLY DOES NOT HAVE INSURANCE. PATIENT IS UNABLE TO SCHEDULE THE ULTRASOUND THAT WAS ORDER BY THE PROVIDER UNTIL SHE GETS NEW INSURANCE AND IS WANTING TO KEEP PROVIDER UPDATED REGARDING THIS TEST. STATES SHE WILL STILL BE COMING TO HER UPCOMING APPT ON 9.23.24 AND PAYING OUT OF POCKET AS OF NOW.  DOES NOT NEED A CALL BACK.       "

## 2024-09-23 ENCOUNTER — OFFICE VISIT (OUTPATIENT)
Dept: FAMILY MEDICINE CLINIC | Facility: CLINIC | Age: 59
End: 2024-09-23

## 2024-09-23 VITALS
HEART RATE: 77 BPM | SYSTOLIC BLOOD PRESSURE: 130 MMHG | OXYGEN SATURATION: 99 % | DIASTOLIC BLOOD PRESSURE: 70 MMHG | HEIGHT: 65 IN | BODY MASS INDEX: 27.34 KG/M2 | WEIGHT: 164.1 LBS | TEMPERATURE: 98.1 F

## 2024-09-23 DIAGNOSIS — Z12.31 BREAST CANCER SCREENING BY MAMMOGRAM: Primary | ICD-10-CM

## 2024-09-23 DIAGNOSIS — M19.90 ARTHRITIS: ICD-10-CM

## 2024-09-23 DIAGNOSIS — N95.1 HOT FLASHES, MENOPAUSAL: ICD-10-CM

## 2024-09-23 RX ORDER — MELOXICAM 15 MG/1
15 TABLET ORAL DAILY
Qty: 30 TABLET | Refills: 5 | Status: SHIPPED | OUTPATIENT
Start: 2024-09-23 | End: 2024-10-23

## 2024-09-23 RX ORDER — MELOXICAM 15 MG/1
15 TABLET ORAL DAILY
COMMUNITY
End: 2024-09-23 | Stop reason: SDUPTHER

## 2024-09-23 NOTE — PROGRESS NOTES
Chief Complaint  Osteoporosis (Follow up /No concerns/)    Subjective        Basia Martin presents to [facility]  History of Present Illness  The patient presents for evaluation of hot flashes.    She continues to experience hot flashes, which have been a persistent issue for her. She has not yet started using Veozah. She was recently terminated from her job on 07/02/2024. She is currently seeking new employment. She has expressed interest in obtaining a medical marijuana card. She has missed three mammogram appointments. She reports a lump that causes discomfort in her lower abdomen, but the pain has lessened due to her busy work schedule. She has been sexually active recently, but not in the past few months.      Past Medical History:   Diagnosis Date    Allergic 6 years old    Better these days -  worst when i was young    Allergies     Anxiety     Chemical dependency     Depression     Hemorrhoids     Irritable bowel syndrome Approx.   2004    Night sweats     Psoriasis     Psychiatric problem     Sinus trouble       Family History   Problem Relation Age of Onset    Diabetes Mother     Heart disease Maternal Grandfather       Social History     Socioeconomic History    Marital status:    Tobacco Use    Smoking status: Former     Current packs/day: 0.00     Average packs/day: 1 pack/day for 45.0 years (45.0 ttl pk-yrs)     Types: Cigarettes     Start date: 1/1/1976     Quit date: 1/1/2021     Years since quitting: 3.7     Passive exposure: Past    Smokeless tobacco: Never   Vaping Use    Vaping status: Every Day    Substances: Nicotine, THC    Devices: Pre-filled pod   Substance and Sexual Activity    Alcohol use: Yes     Alcohol/week: 2.0 standard drinks of alcohol     Types: 2 Glasses of wine per week     Comment: current some day    Drug use: Yes     Types: Marijuana     Comment: everyday    Sexual activity: Not Currently     Partners: Female     Birth  "control/protection: Abstinence, Post-menopausal        Objective   Vital Signs:  /70 (BP Location: Left arm, Patient Position: Sitting, Cuff Size: Adult)   Pulse 77   Temp 98.1 °F (36.7 °C) (Oral)   Ht 165.1 cm (65\")   Wt 74.4 kg (164 lb 1.6 oz)   SpO2 99%   BMI 27.31 kg/m²   Estimated body mass index is 27.31 kg/m² as calculated from the following:    Height as of this encounter: 165.1 cm (65\").    Weight as of this encounter: 74.4 kg (164 lb 1.6 oz).         Review of Systems   Constitutional:  Negative for activity change, chills, fatigue and fever.   HENT:  Negative for congestion, sinus pressure, sinus pain and sore throat.    Eyes:  Negative for discharge and redness.   Respiratory:  Negative for cough and chest tightness.    Cardiovascular:  Negative for chest pain, palpitations and leg swelling.   Gastrointestinal:  Negative for abdominal pain and diarrhea.   Endocrine: Negative for cold intolerance and heat intolerance.   Genitourinary:  Negative for difficulty urinating and dysuria.   Musculoskeletal:  Negative for gait problem and neck stiffness.   Skin:  Negative for pallor and rash.   Neurological:  Negative for dizziness and headaches.   Psychiatric/Behavioral:  Negative for agitation, behavioral problems and confusion.       Physical Exam  Vitals reviewed.   Constitutional:       Appearance: Normal appearance.   HENT:      Right Ear: Tympanic membrane normal.      Left Ear: Tympanic membrane normal.      Nose: Nose normal.   Eyes:      Extraocular Movements: Extraocular movements intact.      Conjunctiva/sclera: Conjunctivae normal.      Pupils: Pupils are equal, round, and reactive to light.   Cardiovascular:      Rate and Rhythm: Normal rate and regular rhythm.   Pulmonary:      Effort: Pulmonary effort is normal.      Breath sounds: Normal breath sounds.   Abdominal:      General: Bowel sounds are normal.   Musculoskeletal:         General: Normal range of motion.      Cervical back: " Normal range of motion.   Skin:     General: Skin is warm and dry.   Neurological:      General: No focal deficit present.      Mental Status: She is alert and oriented to person, place, and time.   Psychiatric:         Mood and Affect: Mood normal.         Behavior: Behavior normal.        Physical Exam        Result Review       Results               Assessment and Plan     Diagnoses and all orders for this visit:    1. Breast cancer screening by mammogram (Primary)  -     Mammo Screening Digital Tomosynthesis Bilateral With CAD; Future    2. Arthritis  -     meloxicam (MOBIC) 15 MG tablet; Take 1 tablet by mouth Daily for 30 days.  Dispense: 30 tablet; Refill: 5    3. Hot flashes, menopausal    Other orders  -     Fezolinetant (VEOZAH) 45 MG tablet; Take 1 tablet by mouth Daily for 30 days.      Assessment & Plan  1. Hot flashes.  She reports persistent hot flashes that have not improved over time. Veozah was recommended as a non-hormonal treatment option to help alleviate her symptoms. Samples of Veozah were provided to see if it helps reduce the hot flashes. The potential benefits and side effects were discussed.    2. Health Maintenance.  She is due for a mammogram, which was last conducted in 2022. A bone density test and a transvaginal ultrasound were also recommended. These tests will be scheduled once her insurance is confirmed.         I spent 15 minutes caring for Basia on this date of service. This time includes time spent by me in the following activities:reviewing tests  Follow Up   Return in about 3 months (around 12/23/2024).  Patient was given instructions and counseling regarding her condition or for health maintenance advice. Please see specific information pulled into the AVS if appropriate.   Patient or patient representative verbalized consent for the use of Ambient Listening during the visit with  Raquel Machado MD for chart documentation. 10/6/2024  16:44 EDT    Raquel Machado,  MD

## 2024-09-25 ENCOUNTER — TELEPHONE (OUTPATIENT)
Dept: GASTROENTEROLOGY | Facility: CLINIC | Age: 59
End: 2024-09-25

## 2024-10-18 ENCOUNTER — TELEPHONE (OUTPATIENT)
Dept: GASTROENTEROLOGY | Facility: CLINIC | Age: 59
End: 2024-10-18

## 2024-10-18 NOTE — TELEPHONE ENCOUNTER
Call placed to patient in attempt to reschedule her 111/21/24 Nurse/MA phone screening to a sooner date. Left message for call back.

## 2024-10-24 RX ORDER — MIRABEGRON 50 MG/1
50 TABLET, FILM COATED, EXTENDED RELEASE ORAL DAILY
Qty: 28 TABLET | Refills: 0 | COMMUNITY
Start: 2024-10-24

## 2025-04-09 ENCOUNTER — TRANSCRIBE ORDERS (OUTPATIENT)
Dept: ADMINISTRATIVE | Facility: HOSPITAL | Age: 60
End: 2025-04-09

## 2025-04-09 DIAGNOSIS — I51.89 OTHER ILL-DEFINED HEART DISEASES: ICD-10-CM

## 2025-04-09 DIAGNOSIS — Z18.9 EMBEDDED FOREIGN BODY: Primary | ICD-10-CM

## 2025-05-15 ENCOUNTER — OFFICE VISIT (OUTPATIENT)
Dept: FAMILY MEDICINE CLINIC | Facility: CLINIC | Age: 60
End: 2025-05-15
Payer: MEDICAID

## 2025-05-15 VITALS
SYSTOLIC BLOOD PRESSURE: 118 MMHG | BODY MASS INDEX: 27.52 KG/M2 | TEMPERATURE: 98.1 F | HEIGHT: 65 IN | OXYGEN SATURATION: 96 % | DIASTOLIC BLOOD PRESSURE: 76 MMHG | WEIGHT: 165.2 LBS | HEART RATE: 79 BPM

## 2025-05-15 DIAGNOSIS — Z00.00 ANNUAL PHYSICAL EXAM: ICD-10-CM

## 2025-05-15 DIAGNOSIS — R79.89 ABNORMAL CBC: ICD-10-CM

## 2025-05-15 DIAGNOSIS — N64.4 BREAST PAIN, LEFT: ICD-10-CM

## 2025-05-15 DIAGNOSIS — M19.90 ARTHRITIS: ICD-10-CM

## 2025-05-15 DIAGNOSIS — N32.81 OVERACTIVE BLADDER: Primary | ICD-10-CM

## 2025-05-15 RX ORDER — MELOXICAM 15 MG/1
1 TABLET ORAL DAILY
COMMUNITY
Start: 2025-04-25 | End: 2025-05-15 | Stop reason: SDUPTHER

## 2025-05-15 RX ORDER — MELOXICAM 15 MG/1
15 TABLET ORAL DAILY
Qty: 30 TABLET | Refills: 11 | Status: SHIPPED | OUTPATIENT
Start: 2025-05-15 | End: 2025-06-14

## 2025-05-15 NOTE — PROGRESS NOTES
Chief Complaint  Annual Exam    Subjective        Basia Martin presents to [facility]  History of Present Illness  The patient presents for evaluation of left breast pain, urinary incontinence, and arthritis.    She reports overall good health, with stable vital signs. She has been experiencing intermittent, sharp, needle-like pain in her breast, occurring approximately 3 times over the past 2 years. Additionally, she reports persistent tightness in her left shoulder. She is not experiencing any chest pain.    She has been off Myrbetriq for the past 1.5 years due to a lack of insurance coverage but is now seeking to resume the medication. She recently acquired new insurance, which she believes covers all her prescriptions. She manages her symptoms by avoiding soda and caffeine, although she continues to use pads for urinary incontinence. She recalls an incident yesterday where she woke up with a wet pad, having forgotten to put one on before bed.    She maintains an active lifestyle, including regular walks and hikes, but expresses concern about the longevity of her legs. She reports that her feet are consistently cold and mentions a history of playing basketball, running track, and playing tennis.    She is currently taking meloxicam and notes a significant difference in her condition when she misses a dose. She does not experience severe knee pain that would cause her to limp.        Medical History: has a past medical history of Allergic (6 years old), Allergies, Anxiety, Chemical dependency, Depression, Hemorrhoids, Irritable bowel syndrome (Approx.   2004), Night sweats, Psoriasis, Psychiatric problem, and Sinus trouble.   Surgical History: has a past surgical history that includes Colonoscopy (2015); Dilation and curettage of uterus; Lymph node biopsy; and Pilonidal Cystectomy.   Family History: family history includes Diabetes in her mother; Heart disease in her maternal  "grandfather.   Social History: reports that she quit smoking about 4 years ago. Her smoking use included cigarettes. She started smoking about 49 years ago. She has a 45 pack-year smoking history. She has been exposed to tobacco smoke. She has never used smokeless tobacco. She reports current alcohol use of about 2.0 standard drinks of alcohol per week. She reports current drug use. Drug: Marijuana.  Immunization History   Administered Date(s) Administered    COVID-19 (MODERNA) 1st,2nd,3rd Dose Monovalent 07/20/2022    COVID-19 (PFIZER) Purple Cap Monovalent 04/15/2021, 05/06/2021    Covid-19 (Pfizer) Gray Cap Monovalent 02/18/2022    Fluzone  >6mos 10/21/2020    Fluzone (or Fluarix & Flulaval for VFC) >6mos 10/21/2020, 10/07/2022, 09/28/2023    Shingrix 06/08/2022, 08/24/2022       Objective   Vital Signs:  /76   Pulse 79   Temp 98.1 °F (36.7 °C) (Oral)   Ht 165.1 cm (65\")   Wt 74.9 kg (165 lb 3.2 oz)   SpO2 96%   BMI 27.49 kg/m²   Estimated body mass index is 27.49 kg/m² as calculated from the following:    Height as of this encounter: 165.1 cm (65\").    Weight as of this encounter: 74.9 kg (165 lb 3.2 oz).     BMI is >= 25 and <30. (Overweight) The following options were offered after discussion;: nutrition counseling/recommendations      ROS:  Review of Systems   Constitutional:  Negative for fatigue and fever.   HENT:  Negative for congestion, ear pain and sinus pressure.    Respiratory:  Negative for cough, chest tightness and shortness of breath.    Cardiovascular:  Negative for chest pain, palpitations and leg swelling.   Gastrointestinal:  Negative for abdominal pain and diarrhea.   Genitourinary:  Positive for breast pain. Negative for dysuria and frequency.   Neurological:  Negative for speech difficulty, headache and confusion.   Psychiatric/Behavioral:  Negative for agitation and behavioral problems.       Physical Exam  Vitals reviewed.   Constitutional:       Appearance: Normal appearance. "   HENT:      Right Ear: Tympanic membrane normal.      Left Ear: Tympanic membrane normal.      Nose: Nose normal.   Eyes:      Extraocular Movements: Extraocular movements intact.      Conjunctiva/sclera: Conjunctivae normal.      Pupils: Pupils are equal, round, and reactive to light.   Cardiovascular:      Rate and Rhythm: Normal rate and regular rhythm.   Pulmonary:      Effort: Pulmonary effort is normal.      Breath sounds: Normal breath sounds.   Abdominal:      General: Bowel sounds are normal.   Musculoskeletal:         General: Normal range of motion.      Cervical back: Normal range of motion.   Skin:     General: Skin is warm and dry.   Neurological:      General: No focal deficit present.      Mental Status: She is alert and oriented to person, place, and time.   Psychiatric:         Mood and Affect: Mood normal.         Behavior: Behavior normal.       Physical Exam  Respiratory: Clear to auscultation, no wheezing, rales or rhonchi  Cardiovascular: Regular rate and rhythm, no murmurs, rubs, or gallops      Result Review     The following data was reviewed by: Raquel Machado MD on 05/15/2025:    Results  Imaging   - CT scan of the chest: No calcified coronary artery disease around the heart and a small amount of plaque in the aorta, but no aneurysm.   - Mammogram of both breasts: Benign calcifications in both breasts with a small area in the left breast that has decreased in size.             Assessment and Plan    Diagnoses and all orders for this visit:    1. Overactive bladder (Primary)  -     Comprehensive Metabolic Panel    2. Abnormal CBC  -     CBC Auto Differential    3. Annual physical exam    4. Breast pain, left  -     Mammo Diagnostic Digital Tomosynthesis Bilateral With CAD; Future  -     US Breast Left Limited; Future    5. Arthritis  -     meloxicam (MOBIC) 15 MG tablet; Take 1 tablet by mouth Daily for 30 days.  Dispense: 30 tablet; Refill: 11      Assessment & Plan  1. Left breast  pain.  - The patient's left breast pain is of concern, particularly given her history of benign calcifications in both breasts and a reduction in size of an area in the left breast.  - A diagnostic mammogram of the left breast will be ordered to further investigate the cause of the pain.  - The patient has not had a mammogram in the past two years due to lack of insurance.  - Diagnostic mammogram of the left breast will be ordered to ensure thorough evaluation of the pain and calcifications.    2. Urinary incontinence.  - She has not been on Myrbetriq for a year and a half due to lack of insurance but wishes to resume the medication.  - The patient reports that avoiding sodas and caffeine helps manage her symptoms, although she still needs to wear a pad.  - Samples of Gemtesa will be provided to manage her urinary incontinence.  - Prescription Drug Monitoring Program was reviewed.    3. Arthritis.  - She is currently taking meloxicam and notes a significant difference in her condition when she misses a dose.  - The patient does not experience severe knee pain that would cause her to limp.  - Meloxicam will continue to be prescribed for arthritis pain management.  - The patient will be monitored for any changes in symptoms or medication effectiveness.    4. Health maintenance.  - Her last Pap smear was conducted in 2023, and she is not due for another until 2028.  - Routine labs will be ordered to monitor her overall health status.  - A low-dose CT scan will be ordered for lung cancer screening.  - The patient will be advised to schedule these screenings once her insurance information is confirmed to ensure coverage.       I spent 35 minutes caring for Basia on this date of service. This time includes time spent by me in the following activities:reviewing tests  Follow Up  Return in about 6 months (around 11/15/2025).  Patient was given instructions and counseling regarding her condition or for health maintenance  advice. Please see specific information pulled into the AVS if appropriate.   Patient or patient representative verbalized consent for the use of Ambient Listening during the visit with  Raquel Machado MD for chart documentation. 5/15/2025  13:50 EDT    Raquel Machado MD